# Patient Record
Sex: MALE | Race: WHITE | Employment: FULL TIME | URBAN - NONMETROPOLITAN AREA
[De-identification: names, ages, dates, MRNs, and addresses within clinical notes are randomized per-mention and may not be internally consistent; named-entity substitution may affect disease eponyms.]

---

## 2020-11-18 ENCOUNTER — HOSPITAL ENCOUNTER (OUTPATIENT)
Age: 40
Setting detail: SPECIMEN
Discharge: HOME | End: 2020-11-18
Payer: COMMERCIAL

## 2020-11-18 ENCOUNTER — OFFICE VISIT (OUTPATIENT)
Dept: PRIMARY CARE CLINIC | Age: 40
End: 2020-11-18
Payer: COMMERCIAL

## 2020-11-18 VITALS
DIASTOLIC BLOOD PRESSURE: 80 MMHG | BODY MASS INDEX: 29.31 KG/M2 | RESPIRATION RATE: 16 BRPM | OXYGEN SATURATION: 97 % | HEART RATE: 65 BPM | HEIGHT: 72 IN | SYSTOLIC BLOOD PRESSURE: 124 MMHG | TEMPERATURE: 97.7 F | WEIGHT: 216.4 LBS

## 2020-11-18 PROCEDURE — U0003 INFECTIOUS AGENT DETECTION BY NUCLEIC ACID (DNA OR RNA); SEVERE ACUTE RESPIRATORY SYNDROME CORONAVIRUS 2 (SARS-COV-2) (CORONAVIRUS DISEASE [COVID-19]), AMPLIFIED PROBE TECHNIQUE, MAKING USE OF HIGH THROUGHPUT TECHNOLOGIES AS DESCRIBED BY CMS-2020-01-R: HCPCS

## 2020-11-18 PROCEDURE — 99213 OFFICE O/P EST LOW 20 MIN: CPT | Performed by: NURSE PRACTITIONER

## 2020-11-18 ASSESSMENT — ENCOUNTER SYMPTOMS
ABDOMINAL PAIN: 0
SORE THROAT: 0
RESPIRATORY NEGATIVE: 1
VOMITING: 0
COUGH: 0
NAUSEA: 0

## 2020-11-18 ASSESSMENT — PATIENT HEALTH QUESTIONNAIRE - PHQ9
SUM OF ALL RESPONSES TO PHQ QUESTIONS 1-9: 0
SUM OF ALL RESPONSES TO PHQ QUESTIONS 1-9: 0
1. LITTLE INTEREST OR PLEASURE IN DOING THINGS: 0
2. FEELING DOWN, DEPRESSED OR HOPELESS: 0
SUM OF ALL RESPONSES TO PHQ QUESTIONS 1-9: 0
SUM OF ALL RESPONSES TO PHQ9 QUESTIONS 1 & 2: 0

## 2020-11-18 NOTE — PROGRESS NOTES
[facility]             [address] [place] [address]                        Telephone [phone]             Fax [phone]     Dominick Barker  1980  MIS:[id number]   Date of visit:  11/18/2020    Subjective:    Dominick Barker is a 41 y.o.  male who presents to [facility] today (11/18/2020) for evaluation of:    Chief Complaint   Patient presents with    Other     no symptoms was exposed to co-worker positive for covid       Other   This is a new problem. The current episode started 1 to 4 weeks ago (exposure 10 days ago to coworker who tested positive this week for Covid-19). Pertinent negatives include no abdominal pain, chest pain, chills, congestion, coughing, fatigue, fever, headaches, myalgias, nausea, sore throat or vomiting. Nothing aggravates the symptoms. He has tried nothing for the symptoms. The treatment provided no relief. He has the following problem list:  Patient Active Problem List   Diagnosis    Peritonsillar abscess    Tobacco abuse    Tachycardia    Strep pharyngitis        Current medications are:  No current outpatient medications on file. No current facility-administered medications for this visit. He has No Known Allergies. Tavo Myers He  reports that he has been smoking. He started smoking about 8 years ago. He has been smoking about 0.25 packs per day. He does not have any smokeless tobacco history on file. Objective:    Vitals:    11/18/20 1016   BP: 124/80   Pulse: 65   Resp: 16   Temp: 97.7 °F (36.5 °C)   TempSrc: Temporal   SpO2: 97%   Weight: 216 lb 6.4 oz (98.2 kg)   Height: 6' (1.829 m)     Body mass index is 29.35 kg/m². Review of Systems   Constitutional: Negative. Negative for chills, fatigue and fever. HENT: Negative. Negative for congestion and sore throat. Respiratory: Negative. Negative for cough. Cardiovascular: Negative. Negative for chest pain. Gastrointestinal: Negative for abdominal pain, nausea and vomiting. Musculoskeletal: Negative for myalgias. Neurological: Negative for headaches. Physical Exam  Vitals signs and nursing note reviewed. Constitutional:       Appearance: He is well-developed. HENT:      Head: Normocephalic. Jaw: There is normal jaw occlusion. Right Ear: Tympanic membrane, ear canal and external ear normal.      Left Ear: Tympanic membrane, ear canal and external ear normal.      Nose: Nose normal.      Mouth/Throat:      Lips: Pink. Mouth: Mucous membranes are moist.      Pharynx: Oropharynx is clear. Uvula midline. Eyes:      Pupils: Pupils are equal, round, and reactive to light. Neck:      Musculoskeletal: Normal range of motion and neck supple. Cardiovascular:      Rate and Rhythm: Normal rate and regular rhythm. Heart sounds: Normal heart sounds. Pulmonary:      Effort: Pulmonary effort is normal.      Breath sounds: Normal breath sounds and air entry. Lymphadenopathy:      Cervical: No cervical adenopathy. Skin:     General: Skin is warm and dry. Neurological:      Mental Status: He is alert and oriented to person, place, and time. Psychiatric:         Behavior: Behavior normal.         Thought Content: Thought content normal.       Assessment and Plan:    No results found for this visit on 11/18/20. Diagnosis Orders   1. Exposure to COVID-19 virus       Self quarantine until negative Covid-19 test result received and symptoms improving. We will call with Covid-19 test results. Symptomatic care as discussed if symptoms develop. Follow up with PCP as needed. The use, risks, benefits, and side effects of prescribed or recommended medications were discussed. All questions were answered and the patient/caregiver voiced understanding. No orders of the defined types were placed in this encounter.         Electronically signed by Sesar Whiting APRN - CNP on 11/18/20 at 10:26 AM EST

## 2020-11-18 NOTE — LETTER
[address] [facility]  [address] [facility] [address]  Phone: [phone]  Fax: [phone]    JOHNNIE Bob CNP        November 18, 2020     Patient: Darylene Lopes   YOB: 1980   Date of Visit: 11/18/2020       To Whom it May Concern:    Connor Watkins was seen in my clinic on 11/18/2020. May return to work with negative Covid-19 test result and improved symptoms. Test result in 3-7 days. If you have any questions or concerns, please don't hesitate to call.     Sincerely,         JOHNNIE Bob - CNP

## 2020-11-18 NOTE — PATIENT INSTRUCTIONS
Patient Education        Viral Infections: Care Instructions  Your Care Instructions     You don't feel well, but it's not clear what's causing it. You may have a viral infection. Viruses cause many illnesses, such as the common cold, influenza, fever, rashes, and the diarrhea, nausea, and vomiting that are often called \"stomach flu. \" You may wonder if antibiotic medicines could make you feel better. But antibiotics only treat infections caused by bacteria. They don't work on viruses. The good news is that viral infections usually aren't serious. Most will go away in a few days without medical treatment. In the meantime, there are a few things you can do to make yourself more comfortable. Follow-up care is a key part of your treatment and safety. Be sure to make and go to all appointments, and call your doctor if you are having problems. It's also a good idea to know your test results and keep a list of the medicines you take. How can you care for yourself at home? · Get plenty of rest if you feel tired. · Take an over-the-counter pain medicine if needed, such as acetaminophen (Tylenol), ibuprofen (Advil, Motrin), or naproxen (Aleve). Read and follow all instructions on the label. · Be careful when taking over-the-counter cold or flu medicines and Tylenol at the same time. Many of these medicines have acetaminophen, which is Tylenol. Read the labels to make sure that you are not taking more than the recommended dose. Too much acetaminophen (Tylenol) can be harmful. · Drink plenty of fluids, enough so that your urine is light yellow or clear like water. If you have kidney, heart, or liver disease and have to limit fluids, talk with your doctor before you increase the amount of fluids you drink. · Stay home from work, school, and other public places while you have a fever. When should you call for help? Call 911 anytime you think you may need emergency care.  For example, call if:    · You have severe serious illnesses like [place] respiratory syndrome (MERS) and severe acute respiratory syndrome (SARS). COVID-19 is caused by a novel coronavirus. That means it's a new type that has not been seen in people before. How is COVID-19 treated? Mild illness can be treated at home, but more serious illness needs to be treated in the hospital. Treatment may include medicines to reduce symptoms, plus breathing support such as oxygen therapy or a ventilator. Other treatments, such as antiviral medicines, may help people who have COVID-19. What can you do to protect yourself from COVID-19? The best way to protect yourself from getting sick is to:  · Avoid areas where there is an outbreak. · Avoid contact with people who may be infected. · Avoid crowds and try to stay at least 6 feet away from other people. · Wash your hands often, especially after you cough or sneeze. Use soap and water, and scrub for at least 20 seconds. If soap and water aren't available, use an alcohol-based hand . · Avoid touching your mouth, nose, and eyes. What can you do to avoid spreading the virus to others? To help avoid spreading the virus to others:  · Wash your hands often with soap or alcohol-based hand sanitizers. · Cover your mouth with a tissue when you cough or sneeze. Then throw the tissue in the trash. · Use a disinfectant to clean things that you touch often. These include doorknobs, remote controls, phones, and handles on your refrigerator and microwave. And don't forget countertops, tabletops, bathrooms, and computer keyboards. · Wear a cloth face cover if you have to go to public areas. If you know or suspect that you have COVID-19:  · Stay home. Don't go to school, work, or public areas. And don't use public transportation, ride-shares, or taxis unless you have no choice. · Leave your home only if you need to get medical care or testing. But call the doctor's office first so they know you're coming.  And wear a face cover. · Limit contact with people in your home. If possible, stay in a separate bedroom and use a separate bathroom. · Wear a face cover whenever you're around other people. It can help stop the spread of the virus when you cough or sneeze. · Clean and disinfect your home every day. Use household  and disinfectant wipes or sprays. Take special care to clean things that you grab with your hands. · Self-isolate until it's safe to be around others again. ? If you have symptoms, it's safe when you haven't had a fever for 3 days and your symptoms have improved and it's been at least 10 days since your symptoms started. ? If you were exposed to the virus but don't have symptoms, it's safe to be around others 14 days after exposure. ? Talk to your doctor about whether you also need testing, especially if you have a weakened immune system. When to call for help  Call 911 anytime you think you may need emergency care. For example, call if:  · You have severe trouble breathing. (You can't talk at all.)  · You have constant chest pain or pressure. · You are severely dizzy or lightheaded. · You are confused or can't think clearly. · Your face and lips have a blue color. · You passed out (lost consciousness) or are very hard to wake up. Call your doctor now if you develop symptoms such as:  · Shortness of breath. · Fever. · Cough. If you need to get care, call ahead to the doctor's office for instructions before you go. Make sure you wear a face cover to prevent exposing other people to the virus. Where can you get the latest information? The following health organizations are tracking and studying this virus. Their websites contain the most up-to-date information. Ming Ao also learn what to do if you think you may have been exposed to the virus. · U.[place]. Centers for Disease Control and Prevention (CDC): The CDC provides updated news about the disease and travel advice.  The website also tells you how to prevent the spread of infection. www.cdc.gov  · World Health Organization [facility]): WHO offers information about the virus outbreaks. WHO also has travel advice. www.who.int  Current as of: July 10, 2020               Content Version: 12.6  © 2006-2020 [employer]. Care instructions adapted under license by [facility] ([facility]). If you have questions about a medical condition or this instruction, always ask your healthcare professional. [address] any warranty or liability for your use of this information. Patient Education        Coronavirus ([id number]): Care Instructions  Overview  The coronavirus disease (COVID-19) is caused by a virus. Symptoms may include a fever, a cough, and shortness of breath. It mainly spreads person-to-person through droplets from coughing and sneezing. The virus also can spread when people are in close contact with someone who is infected. Most people have mild symptoms and can take care of themselves at home. If their symptoms get worse, they may need care in a hospital. Treatment may include medicines to reduce symptoms, plus breathing support such as oxygen therapy or a ventilator. It's important to not spread the virus to others. If you have COVID-19, wear a face cover anytime you are around other people. You need to isolate yourself while you are sick. Leave your home only if you need to get medical care or testing. Follow-up care is a key part of your treatment and safety. Be sure to make and go to all appointments, and call your doctor if you are having problems. It's also a good idea to know your test results and keep a list of the medicines you take. How can you care for yourself at home? · Get extra rest. It can help you feel better. · Drink plenty of fluids. This helps replace fluids lost from fever. Fluids also help ease a scratchy throat. Water, soup, fruit juice, and hot tea with lemon are good choices.   · Take acetaminophen (such as Tylenol) to reduce a fever. It may also help with muscle aches. Read and follow all instructions on the label. · Use petroleum jelly on sore skin. This can help if the skin around your nose and lips becomes sore from rubbing a lot with tissues. Tips for self-isolation  · Limit contact with people in your home. If possible, stay in a separate bedroom and use a separate bathroom. · Wear a cloth face cover when you are around other people. It can help stop the spread of the virus when you cough or sneeze. · If you have to leave home, avoid crowds and try to stay at least 6 feet away from other people. · Avoid contact with pets and other animals. · Cover your mouth and nose with a tissue when you cough or sneeze. Then throw it in the trash right away. · Wash your hands often, especially after you cough or sneeze. Use soap and water, and scrub for at least 20 seconds. If soap and water aren't available, use an alcohol-based hand . · Don't share personal household items. These include bedding, towels, cups and glasses, and eating utensils. · [address] in the warmest water allowed for the fabric type, and dry it completely. It's okay to wash other people's laundry with yours. · Clean and disinfect your home every day. Use household  and disinfectant wipes or sprays. Take special care to clean things that you grab with your hands. These include doorknobs, remote controls, phones, and handles on your refrigerator and microwave. And don't forget countertops, tabletops, bathrooms, and computer keyboards. When you can end self-isolation  · If you know or suspect that you have COVID-19, stay in self-isolation until:  ? You haven't had a fever for 3 days, and  ? Your symptoms have improved, and  ? It's been at least 10 days since your symptoms started. · Talk to your doctor about whether you also need testing, especially if you have a weakened immune system.   When should you call for help? Call 911 anytime you think you may need emergency care. For example, call if you have life-threatening symptoms, such as:    · You have severe trouble breathing. (You can't talk at all.)     · You have constant chest pain or pressure.     · You are severely dizzy or lightheaded.     · You are confused or can't think clearly.     · Your face and lips have a blue color.     · You pass out (lose consciousness) or are very hard to wake up. Call your doctor now or seek immediate medical care if:    · You have moderate trouble breathing. (You can't speak a full sentence.)     · You are coughing up blood (more than about 1 teaspoon).     · You have signs of low blood pressure. These include feeling lightheaded; being too weak to stand; and having cold, pale, clammy skin. Watch closely for changes in your health, and be sure to contact your doctor if:    · Your symptoms get worse.     · You are not getting better as expected. Call before you go to the doctor's office. Follow their instructions. And wear a cloth face cover. Current as of: July 10, 2020               Content Version: 12.6  © 2006-2020 [employer]. Care instructions adapted under license by [facility] ([facility]). If you have questions about a medical condition or this instruction, always ask your healthcare professional. [address] any warranty or liability for your use of this information.

## 2020-11-20 LAB — SARS-COV-2, NAA: NOT DETECTED

## 2021-06-10 ENCOUNTER — OFFICE VISIT (OUTPATIENT)
Dept: FAMILY MEDICINE CLINIC | Age: 41
End: 2021-06-10
Payer: COMMERCIAL

## 2021-06-10 VITALS
TEMPERATURE: 98.8 F | HEIGHT: 72 IN | HEART RATE: 68 BPM | OXYGEN SATURATION: 98 % | RESPIRATION RATE: 20 BRPM | BODY MASS INDEX: 29.12 KG/M2 | WEIGHT: 215 LBS | SYSTOLIC BLOOD PRESSURE: 121 MMHG | DIASTOLIC BLOOD PRESSURE: 80 MMHG

## 2021-06-10 DIAGNOSIS — J02.0 STREP THROAT: Primary | ICD-10-CM

## 2021-06-10 DIAGNOSIS — J02.9 SORE THROAT: ICD-10-CM

## 2021-06-10 LAB — S PYO AG THROAT QL: POSITIVE

## 2021-06-10 PROCEDURE — 99213 OFFICE O/P EST LOW 20 MIN: CPT | Performed by: NURSE PRACTITIONER

## 2021-06-10 PROCEDURE — 87880 STREP A ASSAY W/OPTIC: CPT | Performed by: NURSE PRACTITIONER

## 2021-06-10 RX ORDER — PENICILLIN V POTASSIUM 500 MG/1
500 TABLET ORAL 2 TIMES DAILY
Qty: 20 TABLET | Refills: 0 | Status: SHIPPED | OUTPATIENT
Start: 2021-06-10 | End: 2021-06-20

## 2021-06-10 SDOH — ECONOMIC STABILITY: FOOD INSECURITY: WITHIN THE PAST 12 MONTHS, THE FOOD YOU BOUGHT JUST DIDN'T LAST AND YOU DIDN'T HAVE MONEY TO GET MORE.: NEVER TRUE

## 2021-06-10 SDOH — ECONOMIC STABILITY: FOOD INSECURITY: WITHIN THE PAST 12 MONTHS, YOU WORRIED THAT YOUR FOOD WOULD RUN OUT BEFORE YOU GOT MONEY TO BUY MORE.: NEVER TRUE

## 2021-06-10 ASSESSMENT — ENCOUNTER SYMPTOMS
SORE THROAT: 1
VOMITING: 0
COUGH: 0
NAUSEA: 1

## 2021-06-10 ASSESSMENT — PATIENT HEALTH QUESTIONNAIRE - PHQ9
SUM OF ALL RESPONSES TO PHQ QUESTIONS 1-9: 0
SUM OF ALL RESPONSES TO PHQ QUESTIONS 1-9: 0
SUM OF ALL RESPONSES TO PHQ9 QUESTIONS 1 & 2: 0
2. FEELING DOWN, DEPRESSED OR HOPELESS: 0
SUM OF ALL RESPONSES TO PHQ QUESTIONS 1-9: 0
1. LITTLE INTEREST OR PLEASURE IN DOING THINGS: 0

## 2021-06-10 ASSESSMENT — SOCIAL DETERMINANTS OF HEALTH (SDOH): HOW HARD IS IT FOR YOU TO PAY FOR THE VERY BASICS LIKE FOOD, HOUSING, MEDICAL CARE, AND HEATING?: NOT HARD AT ALL

## 2021-06-10 NOTE — PROGRESS NOTES
[facility] [place] [address] In [address] [facility], APRN-[facility]  [address]  Phone:  [phone]  Fax:  [phone]  Anthony Sheriff is a 41 y.o. male who presents today for his medical conditions/complaints as noted below. Anthony Sheriff c/o of Pharyngitis (trouble swollowing, lumps on left side of face )      HPI:     Pharyngitis  This is a new problem. The current episode started in the past 7 days (2 days). The problem has been gradually worsening. Associated symptoms include nausea, neck pain and a sore throat. Pertinent negatives include no coughing, fever, headaches, rash or vomiting. He has tried nothing for the symptoms. Wt Readings from Last 3 Encounters:   06/10/21 215 lb (97.5 kg)   11/18/20 216 lb 6.4 oz (98.2 kg)   01/04/16 237 lb 11.2 oz (107.8 kg)       Temp Readings from Last 3 Encounters:   06/10/21 98.8 °F (37.1 °C)   11/18/20 97.7 °F (36.5 °C) (Temporal)   01/04/16 97.9 °F (36.6 °C) (Oral)       BP Readings from Last 3 Encounters:   06/10/21 121/80   11/18/20 124/80   01/04/16 126/74       Pulse Readings from Last 3 Encounters:   06/10/21 68   11/18/20 65   01/04/16 72              History reviewed. No pertinent past medical history.    Past Surgical History:   Procedure Laterality Date    EKG 12-LEAD  1/4/2016         SINUS SURGERY       Family History   Problem Relation Age of Onset    Other Mother     Heart Disease Father     High Blood Pressure Father     High Cholesterol Father     Cancer Father      Social History     Tobacco Use    Smoking status: Light Tobacco Smoker     Packs/day: 0.25     Start date: 11/18/2012    Smokeless tobacco: Never Used   Substance Use Topics    Alcohol use: Yes     Comment: occasional      Current Outpatient Medications   Medication Sig Dispense Refill    penicillin v potassium (VEETID) 500 MG tablet Take 1 tablet by mouth 2 times daily for 10 days 20 tablet 0     No current facility-administered medications for this visit. No Known Allergies    No exam data present    Subjective:      Review of Systems   Constitutional: Negative for fever. HENT: Positive for sore throat. Respiratory: Negative for cough. Gastrointestinal: Positive for nausea. Negative for vomiting. Musculoskeletal: Positive for neck pain. Skin: Negative for rash. Neurological: Negative for headaches. Objective:     /80 (Site: Left Upper Arm, Position: Sitting, Cuff Size: Large Adult)   Pulse 68   Temp 98.8 °F (37.1 °C)   Resp 20   Ht 6' (1.829 m)   Wt 215 lb (97.5 kg)   SpO2 98%   BMI 29.16 kg/m²     Physical Exam  Vitals reviewed. Constitutional:       General: He is not in acute distress. Appearance: He is well-developed. He is not ill-appearing, toxic-appearing or diaphoretic. HENT:      Head: Normocephalic. Right Ear: Tympanic membrane, ear canal and external ear normal.      Left Ear: Tympanic membrane, ear canal and external ear normal.      Nose: Nose normal. No mucosal edema, congestion or rhinorrhea. Mouth/Throat:      Mouth: Mucous membranes are moist. Mucous membranes are not pale and not dry. Pharynx: Oropharynx is clear. Posterior oropharyngeal erythema present. Eyes:      General: Lids are normal. No scleral icterus. Right eye: No discharge. Left eye: No discharge. Extraocular Movements:      Right eye: No nystagmus. Left eye: No nystagmus. Conjunctiva/sclera: Conjunctivae normal.   Neck:      Trachea: Trachea normal.   Cardiovascular:      Rate and Rhythm: Normal rate and regular rhythm. Heart sounds: Normal heart sounds. Pulmonary:      Effort: Pulmonary effort is normal. No accessory muscle usage or respiratory distress. Breath sounds: Normal breath sounds. Musculoskeletal:         General: Normal range of motion. Cervical back: Full passive range of motion without pain and normal range of motion.    Lymphadenopathy:      Head: Right side of head: Tonsillar adenopathy present. Left side of head: Tonsillar adenopathy present. Skin:     General: Skin is warm and dry. Capillary Refill: Capillary refill takes less than 2 seconds. Coloration: Skin is not pale. Neurological:      Mental Status: He is alert and oriented to person, place, and time. Psychiatric:         Mood and Affect: Mood normal.         Speech: Speech normal.         Behavior: Behavior normal.         Thought Content: Thought content normal.         Judgment: Judgment normal.         Assessment:      Diagnosis Orders   1. Strep throat  penicillin v potassium (VEETID) 500 MG tablet   2. Sore throat  POCT rapid strep A     Results for POC orders placed in visit on 06/10/21   POCT rapid strep A   Result Value Ref Range    Strep A Ag Positive (A) None Detected               Plan:         Penicillin as directed. Work note for tomorrow. Follow up with primary care provider in 1 to 2 days if needed. Patient Instructions     Penicillin as directed. Work note for tomorrow. Follow up with primary care provider in 1 to 2 days if needed. Patient Education        Strep Throat: Care Instructions  Your Care Instructions     Strep throat is a bacterial infection that causes sudden, severe sore throat and fever. Strep throat, which is caused by bacteria called streptococcus, is treated with antibiotics. Sometimes a strep test is necessary to tell if the sore throat is caused by strep bacteria. Treatment can help ease symptoms and may prevent future problems. Follow-up care is a key part of your treatment and safety. Be sure to make and go to all appointments, and call your doctor if you are having problems. It's also a good idea to know your test results and keep a list of the medicines you take. How can you care for yourself at home? · Take your antibiotics as directed. Do not stop taking them just because you feel better.  You need to take the full course of antibiotics. · Strep throat can spread to others until 24 hours after you begin taking antibiotics. During this time, avoid contact with other people at work, school, or home, especially infants and children. Do not sneeze or cough on others, and wash your hands often. Keep your drinking glass and eating utensils separate from those of others. Wash these items well in hot, soapy water. · Gargle with warm salt water at least once each hour to help reduce swelling and make your throat feel better. Use 1 teaspoon of salt mixed in 8 fluid ounces of warm water. · Take an over-the-counter pain medication, such as acetaminophen (Tylenol), ibuprofen (Advil, Motrin), or naproxen (Aleve). Read and follow all instructions on the label. · Try an over-the-counter anesthetic throat spray or throat lozenges, which may help relieve throat pain. · Drink plenty of fluids. Fluids may help soothe an irritated throat. Hot fluids, such as tea or soup, may help your throat feel better. · Eat soft solids and drink plenty of clear liquids. Flavored ice pops, ice cream, scrambled eggs, sherbet, and gelatin dessert (such as Jell-O) may also soothe the throat. · Get lots of rest.  · Do not smoke, and avoid secondhand smoke. If you need help quitting, talk to your doctor about stop-smoking programs and medicines. These can increase your chances of quitting for good. · Use a vaporizer or humidifier to add moisture to the air in your bedroom. Follow the directions for cleaning the machine. When should you call for help? Call your doctor now or seek immediate medical care if:    · You have a new or higher fever.     · You have a fever with a stiff neck or severe headache.     · You have new or worse trouble swallowing.     · Your sore throat gets much worse on one side.     · Your pain becomes much worse on one side of your throat.    Watch closely for changes in your health, and be sure to contact your doctor if:    · You are not getting better after 2 days (48 hours).     · You do not get better as expected. Where can you learn more? Go to https://S4 Worldwidepepiceweb.EQAL. org and sign in to your HipSnip account. Enter K625 in the [facility] box to learn more about \"Strep Throat: Care Instructions. \"     If you do not have an account, please click on the \"Sign Up Now\" link. Current as of: December 2, 2020               Content Version: 12.8  © 5456-7358 Healthwise, [employer]. Care instructions adapted under license by [facility] ([facility]). If you have questions about a medical condition or this instruction, always ask your healthcare professional. [place] [address] any warranty or liability for your use of this information. Patient/Caregiver instructed on use, benefit, and side effects of prescribed medications. All patient/parent/caregiver questions answered. Patient/parent/caregiver voiced understanding. Reviewed health maintenance. Instructed to continue current medications, diet and exercise. Patient agreed with treatment plan. Follow up as directed.            Electronically signed by JOHNNIE Manjarrez NP on6/10/2021

## 2021-06-10 NOTE — PATIENT INSTRUCTIONS
Penicillin as directed. Work note for tomorrow. Follow up with primary care provider in 1 to 2 days if needed. Patient Education        Strep Throat: Care Instructions  Your Care Instructions     Strep throat is a bacterial infection that causes sudden, severe sore throat and fever. Strep throat, which is caused by bacteria called streptococcus, is treated with antibiotics. Sometimes a strep test is necessary to tell if the sore throat is caused by strep bacteria. Treatment can help ease symptoms and may prevent future problems. Follow-up care is a key part of your treatment and safety. Be sure to make and go to all appointments, and call your doctor if you are having problems. It's also a good idea to know your test results and keep a list of the medicines you take. How can you care for yourself at home? · Take your antibiotics as directed. Do not stop taking them just because you feel better. You need to take the full course of antibiotics. · Strep throat can spread to others until 24 hours after you begin taking antibiotics. During this time, avoid contact with other people at work, school, or home, especially infants and children. Do not sneeze or cough on others, and wash your hands often. Keep your drinking glass and eating utensils separate from those of others. Wash these items well in hot, soapy water. · Gargle with warm salt water at least once each hour to help reduce swelling and make your throat feel better. Use 1 teaspoon of salt mixed in 8 fluid ounces of warm water. · Take an over-the-counter pain medication, such as acetaminophen (Tylenol), ibuprofen (Advil, Motrin), or naproxen (Aleve). Read and follow all instructions on the label. · Try an over-the-counter anesthetic throat spray or throat lozenges, which may help relieve throat pain. · Drink plenty of fluids. Fluids may help soothe an irritated throat. Hot fluids, such as tea or soup, may help your throat feel better.   · Eat soft solids and drink plenty of clear liquids. Flavored ice pops, ice cream, scrambled eggs, sherbet, and gelatin dessert (such as Jell-O) may also soothe the throat. · Get lots of rest.  · Do not smoke, and avoid secondhand smoke. If you need help quitting, talk to your doctor about stop-smoking programs and medicines. These can increase your chances of quitting for good. · Use a vaporizer or humidifier to add moisture to the air in your bedroom. Follow the directions for cleaning the machine. When should you call for help? Call your doctor now or seek immediate medical care if:    · You have a new or higher fever.     · You have a fever with a stiff neck or severe headache.     · You have new or worse trouble swallowing.     · Your sore throat gets much worse on one side.     · Your pain becomes much worse on one side of your throat. Watch closely for changes in your health, and be sure to contact your doctor if:    · You are not getting better after 2 days (48 hours).     · You do not get better as expected. Where can you learn more? Go to https://[employer].ENOVIX. org and sign in to your Ogin account. Enter K625 in the [facility] box to learn more about \"Strep Throat: Care Instructions. \"     If you do not have an account, please click on the \"Sign Up Now\" link. Current as of: December 2, 2020               Content Version: 12.8  © 1792-2826 Healthwise, [facility]. Care instructions adapted under license by [facility] ([facility]). If you have questions about a medical condition or this instruction, always ask your healthcare professional. [place] [address] any warranty or liability for your use of this information.

## 2021-09-07 ENCOUNTER — OFFICE VISIT (OUTPATIENT)
Dept: FAMILY MEDICINE CLINIC | Age: 41
End: 2021-09-07
Payer: COMMERCIAL

## 2021-09-07 VITALS
SYSTOLIC BLOOD PRESSURE: 138 MMHG | BODY MASS INDEX: 28.44 KG/M2 | OXYGEN SATURATION: 96 % | HEIGHT: 72 IN | TEMPERATURE: 99.5 F | HEART RATE: 68 BPM | WEIGHT: 210 LBS | DIASTOLIC BLOOD PRESSURE: 90 MMHG

## 2021-09-07 DIAGNOSIS — J02.9 SORE THROAT: ICD-10-CM

## 2021-09-07 DIAGNOSIS — J02.8 ACUTE BACTERIAL PHARYNGITIS: Primary | ICD-10-CM

## 2021-09-07 DIAGNOSIS — B96.89 ACUTE BACTERIAL PHARYNGITIS: Primary | ICD-10-CM

## 2021-09-07 LAB — S PYO AG THROAT QL: NORMAL

## 2021-09-07 PROCEDURE — 99213 OFFICE O/P EST LOW 20 MIN: CPT | Performed by: NURSE PRACTITIONER

## 2021-09-07 PROCEDURE — 87880 STREP A ASSAY W/OPTIC: CPT | Performed by: NURSE PRACTITIONER

## 2021-09-07 RX ORDER — IBUPROFEN 800 MG/1
800 TABLET ORAL EVERY 8 HOURS PRN
Qty: 90 TABLET | Refills: 0 | Status: SHIPPED | OUTPATIENT
Start: 2021-09-07

## 2021-09-07 RX ORDER — AMOXICILLIN AND CLAVULANATE POTASSIUM 875; 125 MG/1; MG/1
1 TABLET, FILM COATED ORAL 2 TIMES DAILY
Qty: 20 TABLET | Refills: 0 | Status: SHIPPED | OUTPATIENT
Start: 2021-09-07 | End: 2021-09-17

## 2021-09-07 RX ORDER — PREDNISONE 20 MG/1
20 TABLET ORAL 2 TIMES DAILY
Qty: 10 TABLET | Refills: 0 | Status: SHIPPED | OUTPATIENT
Start: 2021-09-07 | End: 2021-09-12

## 2021-09-07 ASSESSMENT — ENCOUNTER SYMPTOMS: SORE THROAT: 1

## 2021-09-07 NOTE — PATIENT INSTRUCTIONS
Augmentin as directed. Ibuprofen 800 mg every 8 hours for tonight. In morning start prednisone. Stop ibuprofen for 5 days. Follow up with primary care provider in 1 to 2 days if needed. Patient Education        Sore Throat: Care Instructions  Your Care Instructions     Infection by bacteria or a virus causes most sore throats. Cigarette smoke, dry air, air pollution, allergies, and yelling can also cause a sore throat. Sore throats can be painful and annoying. Fortunately, most sore throats go away on their own. If you have a bacterial infection, your doctor may prescribe antibiotics. Follow-up care is a key part of your treatment and safety. Be sure to make and go to all appointments, and call your doctor if you are having problems. It's also a good idea to know your test results and keep a list of the medicines you take. How can you care for yourself at home? · If your doctor prescribed antibiotics, take them as directed. Do not stop taking them just because you feel better. You need to take the full course of antibiotics. · Gargle with warm salt water once an hour to help reduce swelling and relieve discomfort. Use 1 teaspoon of salt mixed in 1 cup of warm water. · Take an over-the-counter pain medicine, such as acetaminophen (Tylenol), ibuprofen (Advil, Motrin), or naproxen (Aleve). Read and follow all instructions on the label. · Be careful when taking over-the-counter cold or flu medicines and Tylenol at the same time. Many of these medicines have acetaminophen, which is Tylenol. Read the labels to make sure that you are not taking more than the recommended dose. Too much acetaminophen (Tylenol) can be harmful. · Drink plenty of fluids. Fluids may help soothe an irritated throat. Hot fluids, such as tea or soup, may help decrease throat pain. · Use over-the-counter throat lozenges to soothe pain. Regular cough drops or hard candy may also help.  These should not be given to young children because of the risk of choking. · Do not smoke or allow others to smoke around you. If you need help quitting, talk to your doctor about stop-smoking programs and medicines. These can increase your chances of quitting for good. · Use a vaporizer or humidifier to add moisture to your bedroom. Follow the directions for cleaning the machine. When should you call for help? Call your doctor now or seek immediate medical care if:    · You have new or worse trouble swallowing.     · Your sore throat gets much worse on one side. Watch closely for changes in your health, and be sure to contact your doctor if you do not get better as expected. Where can you learn more? Go to https://LOGIC DEVICESpepiceweb.[employer]. org and sign in to your ANPI account. Enter G880 in the [facility] box to learn more about \"Sore Throat: Care Instructions. \"     If you do not have an account, please click on the \"Sign Up Now\" link. Current as of: December 2, 2020               Content Version: 12.9  © 2006-2021 Healthwise, Incorporated. Care instructions adapted under license by [facility] ([facility]). If you have questions about a medical condition or this instruction, always ask your healthcare professional. [place] [address] any warranty or liability for your use of this information.

## 2021-09-07 NOTE — PROGRESS NOTES
Take with food. 20 tablet 0    ibuprofen (ADVIL;MOTRIN) 800 MG tablet Take 1 tablet by mouth every 8 hours as needed for Pain (with food) 90 tablet 0    predniSONE (DELTASONE) 20 MG tablet Take 1 tablet by mouth 2 times daily for 5 days 10 tablet 0     No current facility-administered medications for this visit. No Known Allergies    No exam data present    Subjective:      Review of Systems   HENT: Positive for sore throat. Neurological: Positive for headaches. Objective:     BP (!) 138/90 (Site: Left Upper Arm, Position: Sitting, Cuff Size: Large Adult)   Pulse 68   Temp 99.5 °F (37.5 °C)   Ht 6' (1.829 m)   Wt 210 lb (95.3 kg)   SpO2 96%   BMI 28.48 kg/m²     Physical Exam  Constitutional:       General: He is not in acute distress. Appearance: He is well-developed. He is not ill-appearing, toxic-appearing or diaphoretic. HENT:      Head: Normocephalic. Right Ear: Tympanic membrane, ear canal and external ear normal.      Left Ear: Tympanic membrane, ear canal and external ear normal.      Nose: Nose normal. No mucosal edema, congestion or rhinorrhea. Mouth/Throat:      Mouth: Mucous membranes are moist. Mucous membranes are not pale and not dry. Pharynx: Oropharynx is clear. Posterior oropharyngeal erythema and uvula swelling present. Tonsils: 1+ on the right. 4+ on the left. Eyes:      General: Lids are normal. No scleral icterus. Right eye: No discharge. Left eye: No discharge. Extraocular Movements:      Right eye: No nystagmus. Left eye: No nystagmus. Conjunctiva/sclera: Conjunctivae normal.   Neck:      Trachea: Trachea normal.   Cardiovascular:      Rate and Rhythm: Normal rate and regular rhythm. Heart sounds: Normal heart sounds. Pulmonary:      Effort: Pulmonary effort is normal. No accessory muscle usage or respiratory distress. Breath sounds: Normal breath sounds.    Musculoskeletal:         General: Normal range of motion. Cervical back: Full passive range of motion without pain and normal range of motion. Skin:     General: Skin is warm and dry. Capillary Refill: Capillary refill takes less than 2 seconds. Coloration: Skin is not pale. Neurological:      Mental Status: He is alert and oriented to person, place, and time. Psychiatric:         Mood and Affect: Mood normal.         Speech: Speech normal.         Behavior: Behavior normal.         Thought Content: Thought content normal.         Judgment: Judgment normal.         Assessment:      Diagnosis Orders   1. Acute bacterial pharyngitis  amoxicillin-clavulanate (AUGMENTIN) 875-125 MG per tablet    ibuprofen (ADVIL;MOTRIN) 800 MG tablet    predniSONE (DELTASONE) 20 MG tablet   2. Sore throat  POCT rapid strep A     No results found for this visit on 09/07/21. Strep negative. Plan:         Augmentin as directed. Ibuprofen 800 mg every 8 hours for tonight. In morning start prednisone. Stop ibuprofen for 5 days. Follow up with primary care provider in 1 to 2 days if needed. Work note for tomorrow. Patient Instructions     Augmentin as directed. Ibuprofen 800 mg every 8 hours for tonight. In morning start prednisone. Stop ibuprofen for 5 days. Follow up with primary care provider in 1 to 2 days if needed. Patient Education        Sore Throat: Care Instructions  Your Care Instructions     Infection by bacteria or a virus causes most sore throats. Cigarette smoke, dry air, air pollution, allergies, and yelling can also cause a sore throat. Sore throats can be painful and annoying. Fortunately, most sore throats go away on their own. If you have a bacterial infection, your doctor may prescribe antibiotics. Follow-up care is a key part of your treatment and safety. Be sure to make and go to all appointments, and call your doctor if you are having problems.  It's also a good idea to know your test results and keep a list of the medicines you take. How can you care for yourself at home? · If your doctor prescribed antibiotics, take them as directed. Do not stop taking them just because you feel better. You need to take the full course of antibiotics. · Gargle with warm salt water once an hour to help reduce swelling and relieve discomfort. Use 1 teaspoon of salt mixed in 1 cup of warm water. · Take an over-the-counter pain medicine, such as acetaminophen (Tylenol), ibuprofen (Advil, Motrin), or naproxen (Aleve). Read and follow all instructions on the label. · Be careful when taking over-the-counter cold or flu medicines and Tylenol at the same time. Many of these medicines have acetaminophen, which is Tylenol. Read the labels to make sure that you are not taking more than the recommended dose. Too much acetaminophen (Tylenol) can be harmful. · Drink plenty of fluids. Fluids may help soothe an irritated throat. Hot fluids, such as tea or soup, may help decrease throat pain. · Use over-the-counter throat lozenges to soothe pain. Regular cough drops or hard candy may also help. These should not be given to young children because of the risk of choking. · Do not smoke or allow others to smoke around you. If you need help quitting, talk to your doctor about stop-smoking programs and medicines. These can increase your chances of quitting for good. · Use a vaporizer or humidifier to add moisture to your bedroom. Follow the directions for cleaning the machine. When should you call for help? Call your doctor now or seek immediate medical care if:    · You have new or worse trouble swallowing.     · Your sore throat gets much worse on one side. Watch closely for changes in your health, and be sure to contact your doctor if you do not get better as expected. Where can you learn more? Go to https://chfaheem.Moodyo. org and sign in to your Stretchr account.  Enter O063 in the [facility] box to learn more about \"Sore Throat: Care Instructions. \"     If you do not have an account, please click on the \"Sign Up Now\" link. Current as of: December 2, 2020               Content Version: 12.9  © 2006-2021 Healthwise, Incorporated. Care instructions adapted under license by [facility] ([facility]). If you have questions about a medical condition or this instruction, always ask your healthcare professional. [place] [address] any warranty or liability for your use of this information. Patient/Caregiver instructed on use, benefit, and side effects of prescribed medications. All patient/parent/caregiver questions answered. Patient/parent/caregiver voiced understanding. Reviewed health maintenance. Instructed to continue current medications, diet and exercise. Patient agreed with treatment plan. Follow up as directed.            Electronically signed by JOHNNIE Marquez NP on9/7/2021

## 2021-11-29 ENCOUNTER — OFFICE VISIT (OUTPATIENT)
Dept: FAMILY MEDICINE CLINIC | Age: 41
End: 2021-11-29
Payer: COMMERCIAL

## 2021-11-29 ENCOUNTER — HOSPITAL ENCOUNTER (OUTPATIENT)
Age: 41
Setting detail: SPECIMEN
Discharge: HOME | End: 2021-11-29
Payer: COMMERCIAL

## 2021-11-29 VITALS
DIASTOLIC BLOOD PRESSURE: 80 MMHG | WEIGHT: 205 LBS | RESPIRATION RATE: 14 BRPM | BODY MASS INDEX: 27.77 KG/M2 | HEIGHT: 72 IN | HEART RATE: 106 BPM | SYSTOLIC BLOOD PRESSURE: 120 MMHG | OXYGEN SATURATION: 99 % | TEMPERATURE: 103.5 F

## 2021-11-29 DIAGNOSIS — J06.9 VIRAL URI: Primary | ICD-10-CM

## 2021-11-29 DIAGNOSIS — N39.0 URINARY TRACT INFECTION WITHOUT HEMATURIA, SITE UNSPECIFIED: ICD-10-CM

## 2021-11-29 DIAGNOSIS — J06.9 VIRAL URI: ICD-10-CM

## 2021-11-29 DIAGNOSIS — M54.50 ACUTE BILATERAL LOW BACK PAIN WITHOUT SCIATICA: ICD-10-CM

## 2021-11-29 DIAGNOSIS — R50.9 FEVER, UNSPECIFIED FEVER CAUSE: ICD-10-CM

## 2021-11-29 LAB
BILIRUBIN, POC: NEGATIVE
BLOOD URINE, POC: NORMAL
CLARITY, POC: CLEAR
COLOR, POC: YELLOW
GLUCOSE URINE, POC: NEGATIVE
KETONES, POC: NORMAL
LEUKOCYTE EST, POC: NEGATIVE
NITRITE, POC: NEGATIVE
PH, POC: 5.5
PROTEIN, POC: NORMAL
SPECIFIC GRAVITY, POC: >1.03
UROBILINOGEN, POC: 0.2

## 2021-11-29 PROCEDURE — 99203 OFFICE O/P NEW LOW 30 MIN: CPT | Performed by: NURSE PRACTITIONER

## 2021-11-29 PROCEDURE — 81002 URINALYSIS NONAUTO W/O SCOPE: CPT | Performed by: NURSE PRACTITIONER

## 2021-11-29 PROCEDURE — U0005 INFEC AGEN DETEC AMPLI PROBE: HCPCS

## 2021-11-29 PROCEDURE — U0003 INFECTIOUS AGENT DETECTION BY NUCLEIC ACID (DNA OR RNA); SEVERE ACUTE RESPIRATORY SYNDROME CORONAVIRUS 2 (SARS-COV-2) (CORONAVIRUS DISEASE [COVID-19]), AMPLIFIED PROBE TECHNIQUE, MAKING USE OF HIGH THROUGHPUT TECHNOLOGIES AS DESCRIBED BY CMS-2020-01-R: HCPCS

## 2021-11-29 ASSESSMENT — ENCOUNTER SYMPTOMS
NAUSEA: 1
RHINORRHEA: 1
ABDOMINAL PAIN: 1
VOMITING: 0
WHEEZING: 0
SINUS PRESSURE: 1
DIARRHEA: 0
SORE THROAT: 0
CONSTIPATION: 0
SHORTNESS OF BREATH: 0
BACK PAIN: 1
EYES NEGATIVE: 1
COUGH: 0
CHEST TIGHTNESS: 0
ALLERGIC/IMMUNOLOGIC NEGATIVE: 1

## 2021-11-29 NOTE — PROGRESS NOTES
[address]  [address] [place] [address]  Dept: [phone]  Dept Fax: [phone]  Loc: [phone]    Dane Haynes is a 35 y.o. male who presents to the [facility] [place] today for his medical conditions/complaints as noted below. Dane Haynes is c/o of Nasal Congestion (nasal congestion, lower back pain, s/s started wednesday. exposure to covid)          HPI:     Wednesday started with sinus congestion, tired, congestion. Back pain started Saturday or Sunday progressively worse. Working a lot of hours and not eating well. Not sleeping well and using energy drinks and caffeine. No past medical history on file. No Known Allergies    Wt Readings from Last 3 Encounters:   11/29/21 205 lb (93 kg)   09/07/21 210 lb (95.3 kg)   06/10/21 215 lb (97.5 kg)     BP Readings from Last 3 Encounters:   11/29/21 120/80   09/07/21 (!) 138/90   06/10/21 121/80      Temp Readings from Last 3 Encounters:   11/29/21 103.5 °F (39.7 °C)   09/07/21 99.5 °F (37.5 °C)   06/10/21 98.8 °F (37.1 °C)     Pulse Readings from Last 3 Encounters:   11/29/21 106   09/07/21 68   06/10/21 68     SpO2 Readings from Last 3 Encounters:   11/29/21 99%   09/07/21 96%   06/10/21 98%       Subjective:      Review of Systems   Constitutional: Positive for activity change, appetite change, chills, diaphoresis, fatigue and fever. HENT: Positive for congestion, rhinorrhea and sinus pressure. Negative for ear pain, postnasal drip and sore throat. Eyes: Negative. Respiratory: Negative for cough, chest tightness, shortness of breath and wheezing. Cardiovascular: Negative for chest pain and palpitations. Gastrointestinal: Positive for abdominal pain and nausea. Negative for constipation, diarrhea and vomiting. Endocrine: Negative. Genitourinary: Positive for flank pain.  Negative for difficulty urinating and dysuria. Musculoskeletal: Positive for back pain and myalgias. Entire body pain     Allergic/Immunologic: Negative. Neurological: Negative. Negative for dizziness, syncope and light-headedness. Hematological: Negative. Psychiatric/Behavioral: Negative. Objective:     Vitals:    11/29/21 1538   BP: 120/80   Site: Right Upper Arm   Position: Sitting   Cuff Size: Medium Adult   Pulse: 106   Resp: 14   Temp: 103.5 °F (39.7 °C)   SpO2: 99%   Weight: 205 lb (93 kg)   Height: 6' (1.829 m)     Body mass index is 27.8 kg/m². /80 (Site: Right Upper Arm, Position: Sitting, Cuff Size: Medium Adult)   Pulse 106   Temp 103.5 °F (39.7 °C)   Resp 14   Ht 6' (1.829 m)   Wt 205 lb (93 kg)   SpO2 99%   BMI 27.80 kg/m²   Physical Exam  Vitals and nursing note reviewed. Constitutional:       Appearance: He is obese. He is ill-appearing and toxic-appearing. HENT:      Head: Normocephalic. Right Ear: Tympanic membrane, ear canal and external ear normal.      Left Ear: Tympanic membrane, ear canal and external ear normal.      Nose: Congestion and rhinorrhea (clear) present. Mouth/Throat:      Mouth: Mucous membranes are moist.      Pharynx: Posterior oropharyngeal erythema present. Eyes:      Extraocular Movements: Extraocular movements intact. Conjunctiva/sclera: Conjunctivae normal.      Pupils: Pupils are equal, round, and reactive to light. Cardiovascular:      Rate and Rhythm: Normal rate and regular rhythm. Pulses: Normal pulses. Heart sounds: Normal heart sounds. Pulmonary:      Effort: Pulmonary effort is normal.      Breath sounds: Normal breath sounds. No wheezing or rales. Abdominal:      General: Bowel sounds are normal. There is no distension. Palpations: Abdomen is soft. Tenderness: There is no abdominal tenderness. There is no right CVA tenderness, left CVA tenderness or guarding.    Musculoskeletal:         General: Tenderness and deformity present. No signs of injury. Normal range of motion. Cervical back: Normal range of motion and neck supple. No rigidity or tenderness. Lumbar back: Deformity present. Back:       Comments: Body aches and hurts all over., Large nodulation noted to lower right side of back that was mobile. Non-tender with palpation. Patient was shown area and asked to feel it's firmness. He stated that he was not aware of this nodulation being present prior to today. Lymphadenopathy:      Cervical: No cervical adenopathy. Skin:     General: Skin is warm and dry. Capillary Refill: Capillary refill takes less than 2 seconds. Neurological:      General: No focal deficit present. Mental Status: He is alert and oriented to person, place, and time. Cranial Nerves: No cranial nerve deficit. Sensory: No sensory deficit. Gait: Gait normal.   Psychiatric:         Behavior: Behavior normal.         Judgment: Judgment normal.         Assessment:      Diagnosis Orders   1. Viral URI  COVID-19    POCT Urinalysis no Micro   2. Acute bilateral low back pain without sciatica  COVID-19    POCT Urinalysis no Micro   3. Fever, unspecified fever cause     4. Urinary tract infection without hematuria, site unspecified         Results for POC orders placed in visit on 11/29/21   POCT Urinalysis no Micro   Result Value Ref Range    Color, UA yellow     Clarity, UA clear     Glucose, UA POC negative     Bilirubin, UA negative     Ketones, UA trace     Spec Grav, UA >1.030     Blood, UA POC trace-intact     pH, UA 5.5     Protein, UA POC 3+     Urobilinogen, UA 0.2     Leukocytes, UA negative     Nitrite, UA negative          Plan:   Discussed exam and POCT findings with patient. Based on patient's temperature 103.5, symptoms and preliminary UA results, he was advised to go to the ER for further evaluation and treatment.  He was given explanation that he will require laboratory and possible imaging studies for further evaluation and treatment may include IV fluids or medications. All questions were answered and they verbalized understanding and were agreeable with the plan.        Electronically signed by JOHNNIE Cook CNP on 11/30/2021 at 3:06 PM

## 2021-12-01 LAB
SARS-COV-2: ABNORMAL
SARS-COV-2: DETECTED
SOURCE: ABNORMAL

## 2021-12-04 ENCOUNTER — HOSPITAL ENCOUNTER (EMERGENCY)
Age: 41
Discharge: ANOTHER HOSPITAL | End: 2021-12-04
Attending: EMERGENCY MEDICINE
Payer: COMMERCIAL

## 2021-12-04 ENCOUNTER — HOSPITAL ENCOUNTER (INPATIENT)
Age: 41
LOS: 4 days | Discharge: HOME | DRG: 981 | End: 2021-12-08
Attending: EMERGENCY MEDICINE | Admitting: INTERNAL MEDICINE
Payer: COMMERCIAL

## 2021-12-04 ENCOUNTER — APPOINTMENT (OUTPATIENT)
Dept: CT IMAGING | Age: 41
End: 2021-12-04
Payer: COMMERCIAL

## 2021-12-04 VITALS
SYSTOLIC BLOOD PRESSURE: 124 MMHG | OXYGEN SATURATION: 94 % | BODY MASS INDEX: 27.9 KG/M2 | HEIGHT: 72 IN | WEIGHT: 206 LBS | DIASTOLIC BLOOD PRESSURE: 77 MMHG | TEMPERATURE: 100.6 F | RESPIRATION RATE: 21 BRPM | HEART RATE: 69 BPM

## 2021-12-04 DIAGNOSIS — U07.1 COVID-19: Primary | ICD-10-CM

## 2021-12-04 DIAGNOSIS — R09.02 HYPOXIA: ICD-10-CM

## 2021-12-04 DIAGNOSIS — J12.82 PNEUMONIA DUE TO COVID-19 VIRUS: ICD-10-CM

## 2021-12-04 DIAGNOSIS — J36 PERITONSILLAR ABSCESS: Primary | ICD-10-CM

## 2021-12-04 DIAGNOSIS — U07.1 PNEUMONIA DUE TO COVID-19 VIRUS: ICD-10-CM

## 2021-12-04 DIAGNOSIS — J36 PERITONSILLAR ABSCESS: ICD-10-CM

## 2021-12-04 LAB
ABSOLUTE EOS #: <0.03 K/UL (ref 0–0.44)
ABSOLUTE IMMATURE GRANULOCYTE: <0.03 K/UL (ref 0–0.3)
ABSOLUTE LYMPH #: 0.72 K/UL (ref 1.1–3.7)
ABSOLUTE MONO #: 0.36 K/UL (ref 0.1–1.2)
ALBUMIN SERPL-MCNC: 3.7 G/DL (ref 3.5–5.2)
ALBUMIN/GLOBULIN RATIO: 1.3 (ref 1–2.5)
ALP BLD-CCNC: 70 U/L (ref 40–129)
ALT SERPL-CCNC: 20 U/L (ref 5–41)
ANION GAP SERPL CALCULATED.3IONS-SCNC: 9 MMOL/L (ref 9–17)
AST SERPL-CCNC: 26 U/L
BASOPHILS # BLD: 0 % (ref 0–2)
BASOPHILS ABSOLUTE: <0.03 K/UL (ref 0–0.2)
BILIRUB SERPL-MCNC: 0.4 MG/DL (ref 0.3–1.2)
BILIRUBIN DIRECT: 0.16 MG/DL
BILIRUBIN, INDIRECT: 0.24 MG/DL (ref 0–1)
BNP INTERPRETATION: NORMAL
BUN BLDV-MCNC: 13 MG/DL (ref 6–20)
BUN/CREAT BLD: 14 (ref 9–20)
CALCIUM SERPL-MCNC: 8.7 MG/DL (ref 8.6–10.4)
CHLORIDE BLD-SCNC: 100 MMOL/L (ref 98–107)
CO2: 28 MMOL/L (ref 20–31)
CREAT SERPL-MCNC: 0.93 MG/DL (ref 0.7–1.2)
D-DIMER QUANTITATIVE: 1.24 MG/L FEU (ref 0–0.59)
DIFFERENTIAL TYPE: ABNORMAL
EOSINOPHILS RELATIVE PERCENT: 0 % (ref 1–4)
GFR AFRICAN AMERICAN: >60 ML/MIN
GFR NON-AFRICAN AMERICAN: >60 ML/MIN
GFR SERPL CREATININE-BSD FRML MDRD: ABNORMAL ML/MIN/{1.73_M2}
GFR SERPL CREATININE-BSD FRML MDRD: ABNORMAL ML/MIN/{1.73_M2}
GLOBULIN: 2.8 G/DL (ref 1.5–3.8)
GLUCOSE BLD-MCNC: 125 MG/DL (ref 70–99)
HCT VFR BLD CALC: 45 % (ref 40.7–50.3)
HEMOGLOBIN: 15 G/DL (ref 13–17)
IMMATURE GRANULOCYTES: 0 %
LACTIC ACID, SEPSIS WHOLE BLOOD: NORMAL MMOL/L (ref 0.5–1.9)
LACTIC ACID, SEPSIS WHOLE BLOOD: NORMAL MMOL/L (ref 0.5–1.9)
LACTIC ACID, SEPSIS: 1.2 MMOL/L (ref 0.5–1.9)
LACTIC ACID, SEPSIS: 1.3 MMOL/L (ref 0.5–1.9)
LYMPHOCYTES # BLD: 13 % (ref 24–43)
MCH RBC QN AUTO: 30.7 PG (ref 25.2–33.5)
MCHC RBC AUTO-ENTMCNC: 33.3 G/DL (ref 25.2–33.5)
MCV RBC AUTO: 92.2 FL (ref 82.6–102.9)
MONOCYTES # BLD: 7 % (ref 3–12)
NRBC AUTOMATED: 0 PER 100 WBC
PDW BLD-RTO: 11.9 % (ref 11.8–14.4)
PLATELET # BLD: 158 K/UL (ref 138–453)
PLATELET ESTIMATE: ABNORMAL
PMV BLD AUTO: 9.7 FL (ref 8.1–13.5)
POTASSIUM SERPL-SCNC: 3.9 MMOL/L (ref 3.7–5.3)
PRO-BNP: 86 PG/ML
PROCALCITONIN: 0.06 NG/ML
RBC # BLD: 4.88 M/UL (ref 4.21–5.77)
RBC # BLD: ABNORMAL 10*6/UL
SEG NEUTROPHILS: 80 % (ref 36–65)
SEGMENTED NEUTROPHILS ABSOLUTE COUNT: 4.27 K/UL (ref 1.5–8.1)
SODIUM BLD-SCNC: 137 MMOL/L (ref 135–144)
TOTAL PROTEIN: 6.5 G/DL (ref 6.4–8.3)
TROPONIN INTERP: NORMAL
TROPONIN INTERP: NORMAL
TROPONIN T: NORMAL NG/ML
TROPONIN T: NORMAL NG/ML
TROPONIN, HIGH SENSITIVITY: 6 NG/L (ref 0–22)
TROPONIN, HIGH SENSITIVITY: <6 NG/L (ref 0–22)
WBC # BLD: 5.4 K/UL (ref 3.5–11.3)
WBC # BLD: ABNORMAL 10*3/UL

## 2021-12-04 PROCEDURE — 87040 BLOOD CULTURE FOR BACTERIA: CPT

## 2021-12-04 PROCEDURE — XW0DXM6 INTRODUCTION OF BARICITINIB INTO MOUTH AND PHARYNX, EXTERNAL APPROACH, NEW TECHNOLOGY GROUP 6: ICD-10-PCS | Performed by: INTERNAL MEDICINE

## 2021-12-04 PROCEDURE — 6360000004 HC RX CONTRAST MEDICATION: Performed by: EMERGENCY MEDICINE

## 2021-12-04 PROCEDURE — 6360000002 HC RX W HCPCS: Performed by: NURSE PRACTITIONER

## 2021-12-04 PROCEDURE — 2060000000 HC ICU INTERMEDIATE R&B

## 2021-12-04 PROCEDURE — 6360000002 HC RX W HCPCS: Performed by: INTERNAL MEDICINE

## 2021-12-04 PROCEDURE — 10060 I&D ABSCESS SIMPLE/SINGLE: CPT

## 2021-12-04 PROCEDURE — 80048 BASIC METABOLIC PNL TOTAL CA: CPT

## 2021-12-04 PROCEDURE — 0C9PXZZ DRAINAGE OF TONSILS, EXTERNAL APPROACH: ICD-10-PCS | Performed by: OTOLARYNGOLOGY

## 2021-12-04 PROCEDURE — 87205 SMEAR GRAM STAIN: CPT

## 2021-12-04 PROCEDURE — 80076 HEPATIC FUNCTION PANEL: CPT

## 2021-12-04 PROCEDURE — 2580000003 HC RX 258: Performed by: INTERNAL MEDICINE

## 2021-12-04 PROCEDURE — 84484 ASSAY OF TROPONIN QUANT: CPT

## 2021-12-04 PROCEDURE — 87070 CULTURE OTHR SPECIMN AEROBIC: CPT

## 2021-12-04 PROCEDURE — 87075 CULTR BACTERIA EXCEPT BLOOD: CPT

## 2021-12-04 PROCEDURE — 42700 I&D ABSCESS PERITONSILLAR: CPT | Performed by: OTOLARYNGOLOGY

## 2021-12-04 PROCEDURE — 83880 ASSAY OF NATRIURETIC PEPTIDE: CPT

## 2021-12-04 PROCEDURE — 96365 THER/PROPH/DIAG IV INF INIT: CPT

## 2021-12-04 PROCEDURE — 93005 ELECTROCARDIOGRAM TRACING: CPT | Performed by: EMERGENCY MEDICINE

## 2021-12-04 PROCEDURE — 99285 EMERGENCY DEPT VISIT HI MDM: CPT

## 2021-12-04 PROCEDURE — 6370000000 HC RX 637 (ALT 250 FOR IP): Performed by: EMERGENCY MEDICINE

## 2021-12-04 PROCEDURE — 36415 COLL VENOUS BLD VENIPUNCTURE: CPT

## 2021-12-04 PROCEDURE — 2580000003 HC RX 258: Performed by: EMERGENCY MEDICINE

## 2021-12-04 PROCEDURE — 85025 COMPLETE CBC W/AUTO DIFF WBC: CPT

## 2021-12-04 PROCEDURE — 2709999900 CT SOFT TISSUE NECK W CONTRAST

## 2021-12-04 PROCEDURE — 96375 TX/PRO/DX INJ NEW DRUG ADDON: CPT

## 2021-12-04 PROCEDURE — 99222 1ST HOSP IP/OBS MODERATE 55: CPT | Performed by: INTERNAL MEDICINE

## 2021-12-04 PROCEDURE — 2709999900 CT CHEST PULMONARY EMBOLISM W CONTRAST

## 2021-12-04 PROCEDURE — 83605 ASSAY OF LACTIC ACID: CPT

## 2021-12-04 PROCEDURE — 2500000003 HC RX 250 WO HCPCS: Performed by: STUDENT IN AN ORGANIZED HEALTH CARE EDUCATION/TRAINING PROGRAM

## 2021-12-04 PROCEDURE — 99221 1ST HOSP IP/OBS SF/LOW 40: CPT | Performed by: OTOLARYNGOLOGY

## 2021-12-04 PROCEDURE — 99222 1ST HOSP IP/OBS MODERATE 55: CPT | Performed by: NURSE PRACTITIONER

## 2021-12-04 PROCEDURE — 6360000002 HC RX W HCPCS: Performed by: EMERGENCY MEDICINE

## 2021-12-04 PROCEDURE — 85379 FIBRIN DEGRADATION QUANT: CPT

## 2021-12-04 PROCEDURE — 84145 PROCALCITONIN (PCT): CPT

## 2021-12-04 RX ORDER — ONDANSETRON 2 MG/ML
4 INJECTION INTRAMUSCULAR; INTRAVENOUS EVERY 6 HOURS PRN
Status: DISCONTINUED | OUTPATIENT
Start: 2021-12-04 | End: 2021-12-08 | Stop reason: HOSPADM

## 2021-12-04 RX ORDER — GUAIFENESIN DEXTROMETHORPHAN HYDROBROMIDE ORAL SOLUTION 10; 100 MG/5ML; MG/5ML
5 SOLUTION ORAL EVERY 4 HOURS PRN
Status: DISCONTINUED | OUTPATIENT
Start: 2021-12-04 | End: 2021-12-08 | Stop reason: HOSPADM

## 2021-12-04 RX ORDER — VITAMIN B COMPLEX
6000 TABLET ORAL DAILY
Status: DISCONTINUED | OUTPATIENT
Start: 2021-12-05 | End: 2021-12-08 | Stop reason: HOSPADM

## 2021-12-04 RX ORDER — ACETAMINOPHEN 500 MG
1000 TABLET ORAL ONCE
Status: COMPLETED | OUTPATIENT
Start: 2021-12-04 | End: 2021-12-04

## 2021-12-04 RX ORDER — ACETAMINOPHEN 325 MG/1
650 TABLET ORAL EVERY 6 HOURS PRN
Status: DISCONTINUED | OUTPATIENT
Start: 2021-12-04 | End: 2021-12-08 | Stop reason: HOSPADM

## 2021-12-04 RX ORDER — DEXAMETHASONE SODIUM PHOSPHATE 10 MG/ML
10 INJECTION INTRAMUSCULAR; INTRAVENOUS ONCE
Status: COMPLETED | OUTPATIENT
Start: 2021-12-04 | End: 2021-12-04

## 2021-12-04 RX ORDER — SODIUM CHLORIDE 0.9 % (FLUSH) 0.9 %
5-40 SYRINGE (ML) INJECTION EVERY 12 HOURS SCHEDULED
Status: DISCONTINUED | OUTPATIENT
Start: 2021-12-04 | End: 2021-12-08 | Stop reason: HOSPADM

## 2021-12-04 RX ORDER — LIDOCAINE HYDROCHLORIDE AND EPINEPHRINE 10; 10 MG/ML; UG/ML
20 INJECTION, SOLUTION INFILTRATION; PERINEURAL ONCE
Status: COMPLETED | OUTPATIENT
Start: 2021-12-04 | End: 2021-12-04

## 2021-12-04 RX ORDER — DEXAMETHASONE 4 MG/1
10 TABLET ORAL DAILY
Status: DISCONTINUED | OUTPATIENT
Start: 2021-12-05 | End: 2021-12-08 | Stop reason: HOSPADM

## 2021-12-04 RX ORDER — DEXAMETHASONE 4 MG/1
6 TABLET ORAL DAILY
Status: DISCONTINUED | OUTPATIENT
Start: 2021-12-05 | End: 2021-12-04

## 2021-12-04 RX ORDER — ONDANSETRON 4 MG/1
4 TABLET, ORALLY DISINTEGRATING ORAL EVERY 8 HOURS PRN
Status: DISCONTINUED | OUTPATIENT
Start: 2021-12-04 | End: 2021-12-08 | Stop reason: HOSPADM

## 2021-12-04 RX ORDER — DEXTROSE MONOHYDRATE 25 G/50ML
INJECTION, SOLUTION INTRAVENOUS
Status: DISCONTINUED
Start: 2021-12-04 | End: 2021-12-04 | Stop reason: HOSPADM

## 2021-12-04 RX ORDER — ONDANSETRON 2 MG/ML
4 INJECTION INTRAMUSCULAR; INTRAVENOUS ONCE
Status: COMPLETED | OUTPATIENT
Start: 2021-12-04 | End: 2021-12-04

## 2021-12-04 RX ORDER — VITAMIN B COMPLEX
2000 TABLET ORAL DAILY
Status: DISCONTINUED | OUTPATIENT
Start: 2021-12-12 | End: 2021-12-08 | Stop reason: HOSPADM

## 2021-12-04 RX ORDER — SODIUM CHLORIDE 9 MG/ML
25 INJECTION, SOLUTION INTRAVENOUS PRN
Status: DISCONTINUED | OUTPATIENT
Start: 2021-12-04 | End: 2021-12-08 | Stop reason: HOSPADM

## 2021-12-04 RX ORDER — 0.9 % SODIUM CHLORIDE 0.9 %
1000 INTRAVENOUS SOLUTION INTRAVENOUS ONCE
Status: COMPLETED | OUTPATIENT
Start: 2021-12-04 | End: 2021-12-04

## 2021-12-04 RX ORDER — ACETAMINOPHEN 650 MG/1
650 SUPPOSITORY RECTAL EVERY 6 HOURS PRN
Status: DISCONTINUED | OUTPATIENT
Start: 2021-12-04 | End: 2021-12-08 | Stop reason: HOSPADM

## 2021-12-04 RX ORDER — SODIUM CHLORIDE 0.9 % (FLUSH) 0.9 %
5-40 SYRINGE (ML) INJECTION PRN
Status: DISCONTINUED | OUTPATIENT
Start: 2021-12-04 | End: 2021-12-08 | Stop reason: HOSPADM

## 2021-12-04 RX ORDER — POLYETHYLENE GLYCOL 3350 17 G/17G
17 POWDER, FOR SOLUTION ORAL DAILY PRN
Status: DISCONTINUED | OUTPATIENT
Start: 2021-12-04 | End: 2021-12-08 | Stop reason: HOSPADM

## 2021-12-04 RX ADMIN — HYDROMORPHONE HYDROCHLORIDE 0.5 MG: 1 INJECTION, SOLUTION INTRAMUSCULAR; INTRAVENOUS; SUBCUTANEOUS at 15:44

## 2021-12-04 RX ADMIN — SODIUM CHLORIDE 1000 ML: 9 INJECTION, SOLUTION INTRAVENOUS at 12:30

## 2021-12-04 RX ADMIN — AMPICILLIN SODIUM AND SULBACTAM SODIUM 3000 MG: 2; 1 INJECTION, POWDER, FOR SOLUTION INTRAMUSCULAR; INTRAVENOUS at 15:53

## 2021-12-04 RX ADMIN — LIDOCAINE HYDROCHLORIDE,EPINEPHRINE BITARTRATE 20 ML: 10; .01 INJECTION, SOLUTION INFILTRATION; PERINEURAL at 20:47

## 2021-12-04 RX ADMIN — IOPAMIDOL 100 ML: 755 INJECTION, SOLUTION INTRAVENOUS at 13:44

## 2021-12-04 RX ADMIN — ONDANSETRON 4 MG: 2 INJECTION INTRAMUSCULAR; INTRAVENOUS at 15:43

## 2021-12-04 RX ADMIN — ENOXAPARIN SODIUM 30 MG: 100 INJECTION SUBCUTANEOUS at 23:09

## 2021-12-04 RX ADMIN — ACETAMINOPHEN 1000 MG: 500 TABLET, FILM COATED ORAL at 12:52

## 2021-12-04 RX ADMIN — AMPICILLIN SODIUM AND SULBACTAM SODIUM 3000 MG: 2; 1 INJECTION, POWDER, FOR SOLUTION INTRAMUSCULAR; INTRAVENOUS at 23:08

## 2021-12-04 RX ADMIN — IOPAMIDOL 50 ML: 755 INJECTION, SOLUTION INTRAVENOUS at 15:09

## 2021-12-04 RX ADMIN — DEXAMETHASONE SODIUM PHOSPHATE 10 MG: 10 INJECTION INTRAMUSCULAR; INTRAVENOUS at 15:45

## 2021-12-04 ASSESSMENT — PAIN SCALES - GENERAL
PAINLEVEL_OUTOF10: 10
PAINLEVEL_OUTOF10: 5
PAINLEVEL_OUTOF10: 10
PAINLEVEL_OUTOF10: 10

## 2021-12-04 ASSESSMENT — PAIN DESCRIPTION - PAIN TYPE
TYPE: ACUTE PAIN
TYPE: ACUTE PAIN

## 2021-12-04 ASSESSMENT — ENCOUNTER SYMPTOMS
ABDOMINAL DISTENTION: 0
COLOR CHANGE: 0
SORE THROAT: 1
EYE ITCHING: 0
SHORTNESS OF BREATH: 1
COUGH: 1

## 2021-12-04 ASSESSMENT — PAIN DESCRIPTION - LOCATION: LOCATION: THROAT

## 2021-12-04 NOTE — ED PROVIDER NOTES
[facility] ED  EMERGENCY DEPARTMENT ENCOUNTER      Pt Name: Charles Gore  MRN: [id number]  [place] 1980  Date of evaluation: 12/4/2021  Provider: Maynor Syed MD    CHIEF COMPLAINT     Chief Complaint   Patient presents with    Positive For Covid-19    Hemoptysis     started this am    Pharyngitis         HISTORY OF PRESENT ILLNESS   (Location/Symptom, Timing/Onset, Context/Setting,Quality, Duration, Modifying Factors, Severity)  Note limiting factors. Charles Gore is a 35 y.o. male who presents to the emergency department by EMS for evaluation of shortness of breath, generalized aches and pains and left-sided throat pain with surrounding radiation. Patient has been coughing and today when he brought up a small amount of bloody in his sputum he became concerned. Patient developed myalgias and feverishness 11 days ago and tested positive for Covid 5 days ago. He is unvaccinated for Covid. The history is provided by the patient. Nursing Notes werereviewed. REVIEW OF SYSTEMS    (2-9 systems for level 4, 10 or more for level 5)     Review of Systems   All other systems reviewed and are negative. Except as noted above the remainder of the review of systems was reviewed and negative. PAST MEDICAL HISTORY   History reviewed. No pertinent past medical history. SURGICALHISTORY       Past Surgical History:   Procedure Laterality Date    EKG 12-LEAD  1/4/2016         SINUS SURGERY           CURRENT MEDICATIONS       Previous Medications    IBUPROFEN (ADVIL;MOTRIN) 800 MG TABLET    Take 1 tablet by mouth every 8 hours as needed for Pain (with food)       ALLERGIES     Patient has no known allergies.     FAMILY HISTORY       Family History   Problem Relation Age of Onset    Other Mother     Heart Disease Father     High Blood Pressure Father     High Cholesterol Father     Cancer Father           SOCIAL HISTORY       Social History     Socioeconomic History commands  [place] Coma Scale Score: 15        PHYSICAL EXAM    (up to 7 for level 4, 8 or more for level 5)     ED Triage Vitals   BP Temp Temp Source Pulse Resp SpO2 Height Weight   12/04/21 1242 12/04/21 1250 12/04/21 1250 12/04/21 1242 12/04/21 1242 12/04/21 1242 12/04/21 1242 12/04/21 1242   (!) 144/96 100.6 °F (38.1 °C) Temporal 84 20 94 % 6' (1.829 m) 206 lb (93.4 kg)       Physical Exam  Vitals reviewed. Constitutional:       General: He is not in acute distress. Appearance: He is ill-appearing. HENT:      Head: Normocephalic. Mouth/Throat:      Mouth: Mucous membranes are moist.      Comments: Patient has a left peritonsillar swelling with shift of the uvula away from the midline. His airway is clear and he is not experiencing any stridor or hoarseness of voice. Eyes:      Conjunctiva/sclera: Conjunctivae normal.   Cardiovascular:      Rate and Rhythm: Normal rate and regular rhythm. Pulmonary:      Effort: Pulmonary effort is normal. No respiratory distress. Breath sounds: Normal breath sounds. Abdominal:      Palpations: Abdomen is soft. Tenderness: There is no abdominal tenderness. Musculoskeletal:      Cervical back: Normal range of motion. Skin:     General: Skin is warm and dry. Coloration: Skin is not pale. Findings: No erythema or rash. Neurological:      General: No focal deficit present. Mental Status: He is alert and oriented to person, place, and time. DIAGNOSTIC RESULTS     EKG: All EKG's are interpreted by the Emergency Department Physician who either signs orCo-signs this chart in the absence of a cardiologist.    RADIOLOGY:     Interpretation per the Radiologist below, ifavailable at the time of this note:    CT SOFT TISSUE NECK W CONTRAST   Final Result   Left peritonsillar abscess measuring 3.6 x 1.9 x 1.9 cm. Small bilateral upper lobe infiltrates may represent multifocal pneumonia. COVID-19 pneumonia is a possibility. CT CHEST PULMONARY EMBOLISM W CONTRAST   Final Result   Ground-glass opacity is seen throughout the lungs bilaterally consistent with   atypical pneumonia. No acute or chronic pulmonary embolism. ED BEDSIDE ULTRASOUND:   Performed by ED Physician - none    LABS:  Labs Reviewed   CBC WITH AUTO DIFFERENTIAL - Abnormal; Notable for the following components:       Result Value    Seg Neutrophils 80 (*)     Lymphocytes 13 (*)     Eosinophils % 0 (*)     Absolute Lymph # 0.72 (*)     All other components within normal limits   BASIC METABOLIC PANEL W/ REFLEX TO MG FOR LOW K - Abnormal; Notable for the following components:    Glucose 125 (*)     All other components within normal limits   D-DIMER, QUANTITATIVE - Abnormal; Notable for the following components:    D-Dimer, Quant 1.24 (*)     All other components within normal limits   CULTURE, BLOOD 1   CULTURE, BLOOD 1   HEPATIC FUNCTION PANEL   BRAIN NATRIURETIC PEPTIDE   TROPONIN   TROPONIN   LACTATE, SEPSIS   LACTATE, SEPSIS       All other labs were within normal range ornot returned as of this dictation. EMERGENCY DEPARTMENT COURSE and DIFFERENTIAL DIAGNOSIS/MDM:   Vitals:    Vitals:    12/04/21 1400 12/04/21 1530 12/04/21 1600 12/04/21 1630   BP: 130/85 (!) 122/92 131/84 122/79   Pulse:    67   Resp:    18   Temp:       TempSrc:       SpO2:  92% 96% 94%   Weight:       Height:           ED Course as of 12/04/21 1814   Sat Dec 04, 2021   1812 Patient has been accepted at [address] [place] [address] emergency department by Dr. Blanca Feldman. I also spoke with Dr. Kwame Barrett on-call for ENT who is agreeable to draining his abscess in the emergency department. Patient may require hospitalization after the procedure. He was initially maintaining a stable oxygen saturation of about 94% on room air but his oxygen saturations have started dropping and he has been placed on supplemental oxygen at 2 L a minute right now.  [SH]      ED Course User Index  [SH] Kim Cui MD       Patient has received an initial dose of Unasyn 3 g IV and Decadron 10 mg IV. CT scan of the neck showed a large left peritonsillar abscess without evidence of airway compromise. MDM    CRITICAL CARE TIME   Total Critical Caretime was 45 minutes, excluding separately reportable procedures. There was a high probability of clinically significant/life threatening deterioration in the patient's condition which required my urgent intervention. CONSULTS:  None    PROCEDURES:  Unlessotherwise noted below, none     Procedures    FINAL IMPRESSION      1. Peritonsillar abscess    2. Pneumonia due to COVID-19 virus          DISPOSITION/PLAN   DISPOSITION Decision To Transfer 12/04/2021 06:12:57 PM      PATIENT REFERRED TO:  No follow-up provider specified. DISCHARGE MEDICATIONS:         Problem List:  Patient Active Problem List   Diagnosis Code    Peritonsillar abscess J36    Tobacco abuse Z72.0    Tachycardia R00.0    Strep pharyngitis J02.0           Summation      Patient Course: Transferred    ED Medicationsadministered this visit:    Medications   dextrose 50 % solution (  Not Given 12/4/21 1553)   acetaminophen (TYLENOL) tablet 1,000 mg (1,000 mg Oral Given 12/4/21 1252)   0.9 % sodium chloride bolus (0 mLs IntraVENous Stopped 12/4/21 1300)   iopamidol (ISOVUE-370) 76 % injection 100 mL (100 mLs IntraVENous Given 12/4/21 1344)   iopamidol (ISOVUE-370) 76 % injection 50 mL (50 mLs IntraVENous Given 12/4/21 1509)   ampicillin-sulbactam (UNASYN) 3000 mg ivpb minibag (0 mg IntraVENous Stopped 12/4/21 1625)   dexamethasone (DECADRON) injection 10 mg (10 mg IntraVENous Given 12/4/21 1545)   ondansetron (ZOFRAN) injection 4 mg (4 mg IntraVENous Given 12/4/21 1543)   HYDROmorphone (DILAUDID) injection 0.5 mg (0.5 mg IntraVENous Given 12/4/21 1544)       New Prescriptions from this visit:    New Prescriptions    No medications on file       Follow-up:  No follow-up provider specified. Final Impression:   1. Peritonsillar abscess    2.  Pneumonia due to COVID-19 virus               (Please note that portions of this note were completed with a voice recognitionprogram.  Efforts were made to edit the dictations but occasionally words are mis-transcribed.)    Scott Souza MD (electronically signed)  Attending Emergency Physician            Scott Souza MD  12/04/21 [id number]

## 2021-12-04 NOTE — LETTER
[facility]  [address]  Phone: [phone]            December 8, 2021     Patient: Annette Rodriguez   YOB: 1980   Date of Visit: 12/4/2021       To Whom It May Concern: It is my medical opinion that Richard Chino should remain out of work until 12/13/2021. If you have any questions or concerns, please don't hesitate to call.     Sincerely,        Dr Torey Howard

## 2021-12-05 LAB
ABSOLUTE EOS #: 0 K/UL (ref 0–0.44)
ABSOLUTE IMMATURE GRANULOCYTE: 0 K/UL (ref 0–0.3)
ABSOLUTE LYMPH #: 1.03 K/UL (ref 1.1–3.7)
ABSOLUTE MONO #: 0.55 K/UL (ref 0.1–1.2)
ALBUMIN SERPL-MCNC: 3.7 G/DL (ref 3.5–5.2)
ALBUMIN/GLOBULIN RATIO: 1.3 (ref 1–2.5)
ALP BLD-CCNC: 67 U/L (ref 40–129)
ALT SERPL-CCNC: 15 U/L (ref 5–41)
ANION GAP SERPL CALCULATED.3IONS-SCNC: 10 MMOL/L (ref 9–17)
AST SERPL-CCNC: 23 U/L
BASOPHILS # BLD: 0 % (ref 0–2)
BASOPHILS ABSOLUTE: 0 K/UL (ref 0–0.2)
BILIRUB SERPL-MCNC: 0.48 MG/DL (ref 0.3–1.2)
BUN BLDV-MCNC: 16 MG/DL (ref 6–20)
BUN/CREAT BLD: ABNORMAL (ref 9–20)
C-REACTIVE PROTEIN: 92.4 MG/L (ref 0–5)
CALCIUM SERPL-MCNC: 9.2 MG/DL (ref 8.6–10.4)
CHLORIDE BLD-SCNC: 101 MMOL/L (ref 98–107)
CO2: 28 MMOL/L (ref 20–31)
CREAT SERPL-MCNC: 0.82 MG/DL (ref 0.7–1.2)
DIFFERENTIAL TYPE: ABNORMAL
EKG ATRIAL RATE: 83 BPM
EKG P AXIS: 39 DEGREES
EKG P-R INTERVAL: 160 MS
EKG Q-T INTERVAL: 360 MS
EKG QRS DURATION: 92 MS
EKG QTC CALCULATION (BAZETT): 423 MS
EKG R AXIS: 84 DEGREES
EKG T AXIS: 15 DEGREES
EKG VENTRICULAR RATE: 83 BPM
EOSINOPHILS RELATIVE PERCENT: 0 % (ref 1–4)
FERRITIN: 1050 UG/L (ref 30–400)
FIBRINOGEN: 604 MG/DL (ref 140–420)
GFR AFRICAN AMERICAN: >60 ML/MIN
GFR NON-AFRICAN AMERICAN: >60 ML/MIN
GFR SERPL CREATININE-BSD FRML MDRD: ABNORMAL ML/MIN/{1.73_M2}
GFR SERPL CREATININE-BSD FRML MDRD: ABNORMAL ML/MIN/{1.73_M2}
GLUCOSE BLD-MCNC: 116 MG/DL (ref 70–99)
HCT VFR BLD CALC: 45.6 % (ref 40.7–50.3)
HEMOGLOBIN: 15.4 G/DL (ref 13–17)
IMMATURE GRANULOCYTES: 0 %
LYMPHOCYTES # BLD: 13 % (ref 24–43)
MCH RBC QN AUTO: 30.8 PG (ref 25.2–33.5)
MCHC RBC AUTO-ENTMCNC: 33.8 G/DL (ref 28.4–34.8)
MCV RBC AUTO: 91.2 FL (ref 82.6–102.9)
MONOCYTES # BLD: 7 % (ref 3–12)
MORPHOLOGY: NORMAL
NRBC AUTOMATED: 0 PER 100 WBC
PDW BLD-RTO: 11.9 % (ref 11.8–14.4)
PLATELET # BLD: 204 K/UL (ref 138–453)
PLATELET ESTIMATE: ABNORMAL
PMV BLD AUTO: 9.9 FL (ref 8.1–13.5)
POTASSIUM SERPL-SCNC: 4.8 MMOL/L (ref 3.7–5.3)
RBC # BLD: 5 M/UL (ref 4.21–5.77)
RBC # BLD: ABNORMAL 10*6/UL
SEG NEUTROPHILS: 80 % (ref 36–65)
SEGMENTED NEUTROPHILS ABSOLUTE COUNT: 6.32 K/UL (ref 1.5–8.1)
SODIUM BLD-SCNC: 139 MMOL/L (ref 135–144)
TOTAL PROTEIN: 6.6 G/DL (ref 6.4–8.3)
VITAMIN D 25-HYDROXY: 36 NG/ML (ref 30–100)
WBC # BLD: 7.9 K/UL (ref 3.5–11.3)
WBC # BLD: ABNORMAL 10*3/UL

## 2021-12-05 PROCEDURE — 80053 COMPREHEN METABOLIC PANEL: CPT

## 2021-12-05 PROCEDURE — 36415 COLL VENOUS BLD VENIPUNCTURE: CPT

## 2021-12-05 PROCEDURE — 6360000002 HC RX W HCPCS: Performed by: INTERNAL MEDICINE

## 2021-12-05 PROCEDURE — 82306 VITAMIN D 25 HYDROXY: CPT

## 2021-12-05 PROCEDURE — 99233 SBSQ HOSP IP/OBS HIGH 50: CPT | Performed by: INTERNAL MEDICINE

## 2021-12-05 PROCEDURE — 99232 SBSQ HOSP IP/OBS MODERATE 35: CPT | Performed by: INTERNAL MEDICINE

## 2021-12-05 PROCEDURE — 2060000000 HC ICU INTERMEDIATE R&B

## 2021-12-05 PROCEDURE — 85384 FIBRINOGEN ACTIVITY: CPT

## 2021-12-05 PROCEDURE — 2700000000 HC OXYGEN THERAPY PER DAY

## 2021-12-05 PROCEDURE — 6360000002 HC RX W HCPCS: Performed by: NURSE PRACTITIONER

## 2021-12-05 PROCEDURE — 85025 COMPLETE CBC W/AUTO DIFF WBC: CPT

## 2021-12-05 PROCEDURE — 6370000000 HC RX 637 (ALT 250 FOR IP): Performed by: INTERNAL MEDICINE

## 2021-12-05 PROCEDURE — 82728 ASSAY OF FERRITIN: CPT

## 2021-12-05 PROCEDURE — 6370000000 HC RX 637 (ALT 250 FOR IP): Performed by: NURSE PRACTITIONER

## 2021-12-05 PROCEDURE — 2580000003 HC RX 258: Performed by: NURSE PRACTITIONER

## 2021-12-05 PROCEDURE — 99024 POSTOP FOLLOW-UP VISIT: CPT | Performed by: OTOLARYNGOLOGY

## 2021-12-05 PROCEDURE — 86140 C-REACTIVE PROTEIN: CPT

## 2021-12-05 PROCEDURE — 2580000003 HC RX 258: Performed by: INTERNAL MEDICINE

## 2021-12-05 RX ADMIN — SODIUM CHLORIDE, PRESERVATIVE FREE 10 ML: 5 INJECTION INTRAVENOUS at 08:30

## 2021-12-05 RX ADMIN — AMPICILLIN SODIUM AND SULBACTAM SODIUM 3000 MG: 2; 1 INJECTION, POWDER, FOR SOLUTION INTRAMUSCULAR; INTRAVENOUS at 21:08

## 2021-12-05 RX ADMIN — Medication 6000 UNITS: at 08:30

## 2021-12-05 RX ADMIN — AMPICILLIN SODIUM AND SULBACTAM SODIUM 3000 MG: 2; 1 INJECTION, POWDER, FOR SOLUTION INTRAMUSCULAR; INTRAVENOUS at 08:29

## 2021-12-05 RX ADMIN — SODIUM CHLORIDE, PRESERVATIVE FREE 10 ML: 5 INJECTION INTRAVENOUS at 21:09

## 2021-12-05 RX ADMIN — ENOXAPARIN SODIUM 30 MG: 100 INJECTION SUBCUTANEOUS at 08:29

## 2021-12-05 RX ADMIN — BARICITINIB 4 MG: 2 TABLET, FILM COATED ORAL at 21:09

## 2021-12-05 RX ADMIN — DEXAMETHASONE 10 MG: 4 TABLET ORAL at 08:29

## 2021-12-05 RX ADMIN — AMPICILLIN SODIUM AND SULBACTAM SODIUM 3000 MG: 2; 1 INJECTION, POWDER, FOR SOLUTION INTRAMUSCULAR; INTRAVENOUS at 14:30

## 2021-12-05 RX ADMIN — ENOXAPARIN SODIUM 30 MG: 100 INJECTION SUBCUTANEOUS at 21:09

## 2021-12-05 ASSESSMENT — ENCOUNTER SYMPTOMS
SHORTNESS OF BREATH: 1
ABDOMINAL DISTENTION: 0
SORE THROAT: 1
VOICE CHANGE: 0
BACK PAIN: 1
ABDOMINAL PAIN: 0
TROUBLE SWALLOWING: 1
BLOOD IN STOOL: 0
VOMITING: 0
EYE ITCHING: 0
WHEEZING: 0
COUGH: 0
STRIDOR: 0
DIARRHEA: 0
CONSTIPATION: 0
COLOR CHANGE: 0
NAUSEA: 0
NAUSEA: 1
COUGH: 1

## 2021-12-05 ASSESSMENT — PAIN SCALES - GENERAL
PAINLEVEL_OUTOF10: 0
PAINLEVEL_OUTOF10: 1
PAINLEVEL_OUTOF10: 0
PAINLEVEL_OUTOF10: 0

## 2021-12-05 ASSESSMENT — PAIN DESCRIPTION - PAIN TYPE: TYPE: ACUTE PAIN

## 2021-12-05 ASSESSMENT — PAIN DESCRIPTION - LOCATION: LOCATION: THROAT

## 2021-12-05 NOTE — CONSULTS
[facility] -   Infectious diseases evaluation  admission date 12/4/2021    reason for consultation:   covid and tonsillitis    Impression :   Current:  · covid diffuse pneumonia   · Left tonsillar edema -abscess 3.6 - 1.9 - 1.9 cm  · Drained 12/4 per ENT    Other:  ·   Discussion / summary of stay / plan of care   ·   Recommendations   · Ok for unasyn  · Decadron 10 days - 10 mg  · ENT - did drain the abscess on the bedside, lot of pus, cx pend  · Decide on barcitinib per tonsillar response to AB and per covid condition  · Will follow CRP ferritin    Infection Control Recommendations   · [place] Precautions  · Contact Isolation   · Airborne isolation  · Droplet Isolation      Antimicrobial Stewardship Recommendations   · Simplification of therapy  · Targeted therapy  Coordination ofOutpatient Care:   · Estimated Length of IV antimicrobials:  · Patient will need Midline / picc Catheter Insertion:   · Patient will need SNF:  · Patient will need outpatient wound care:     History of Present Illness:   Initial history:  Louise Qureshi is a 35y.o.-year-old male w a week cough and aches,  Pains sore throat, coughed blood today and came in - ill x 11 days - CT chest covid diffuse pneumonia   covid + 5 days PTA  Non vaccinated  Exam left tonsillar edema w uvula shift. No exudates seen - drained by ENt in ER lots of pus  Sent for cx        Interval changes  12/4/2021   Patient Vitals for the past 8 hrs:   BP Temp Temp src Pulse Resp SpO2   12/04/21 2145 133/89 -- -- 72 23 95 %   12/04/21 2000 (!) 142/89 99.2 °F (37.3 °C) Oral 67 22 97 %       Summary of relevant labs:  Labs:  W 5.4  CRP  Ferritin    Micro:  covid +  Imaging:  CT chest diffuse covid    I have personally reviewed the past medical history, past surgical history, medications, social history, and family history, and I haveupdated the database accordingly. Allergies:   Patient has no known allergies. Review of Systems:     Review of Systems   Constitutional: Positive for activity change. HENT: Positive for sore throat. Negative for congestion. Eyes: Negative for itching. Respiratory: Positive for cough and shortness of breath. Gastrointestinal: Negative for abdominal distention. Endocrine: Negative for heat intolerance. Genitourinary: Negative for dysuria. Musculoskeletal: Negative for arthralgias. Skin: Negative for color change. Allergic/Immunologic: Negative for immunocompromised state. Neurological: Negative for dizziness. Hematological: Negative for adenopathy. Psychiatric/Behavioral: Negative for agitation. Physical Examination :       Physical Exam  Constitutional:       Appearance: Normal appearance. He is normal weight. HENT:      Head: Normocephalic and atraumatic. Nose: Nose normal.      Mouth/Throat:      Mouth: Mucous membranes are moist.      Pharynx: Posterior oropharyngeal erythema present. Eyes:      General: No scleral icterus. Conjunctiva/sclera: Conjunctivae normal.   Cardiovascular:      Rate and Rhythm: Normal rate and regular rhythm. Heart sounds: Normal heart sounds. No murmur heard. Pulmonary:      Breath sounds: No stridor. No rhonchi. Abdominal:      General: There is no distension. Palpations: Abdomen is soft. Tenderness: There is no abdominal tenderness. Genitourinary:     Comments: No vail  Musculoskeletal:         General: No swelling or deformity. Cervical back: Neck supple. No rigidity. Skin:     Coloration: Skin is not jaundiced. Findings: No bruising. Neurological:      General: No focal deficit present. Mental Status: He is alert and oriented to person, place, and time. Psychiatric:         Mood and Affect: Mood normal.         Thought Content: Thought content normal.         Past Medical History:   No past medical history on file.     Past Surgical  History:     Past Surgical History:   Procedure Laterality Date    EKG 12-LEAD  1/4/2016         SINUS SURGERY         Medications:      lidocaine-EPINEPHrine  20 mL IntraDERmal Once    sodium chloride flush  5-40 mL IntraVENous 2 times per day    enoxaparin  30 mg SubCUTAneous BID    dexamethasone  6 mg Oral Daily    [START ON 12/5/2021] Vitamin D  6,000 Units Oral Daily    Followed by   [facility] [place] ON 12/12/2021] Vitamin D  2,000 Units Oral Daily    ampicillin-sulbactam  1,500 mg IntraVENous Q6H       Social History:     Social History     Socioeconomic History    Marital status:      Spouse name: Not on file    Number of children: Not on file    Years of education: Not on file    Highest education level: Not on file   Occupational History    Not on file   Tobacco Use    Smoking status: Light Tobacco Smoker     Packs/day: 0.25     Start date: 11/18/2012    Smokeless tobacco: Never Used   Substance and Sexual Activity    Alcohol use: Yes     Comment: occasional    Drug use: No    Sexual activity: Yes     Partners: Female   Other Topics Concern    Not on file   Social History Narrative    Not on file     Social Determinants of Health     Financial Resource Strain: Low Risk     Difficulty of Paying Living Expenses: Not hard at all   Food Insecurity: No Food Insecurity    Worried About Running Out of Food in the Last Year: Never true    Lakisha of Food in the Last Year: Never true   Transportation Needs:     Lack of Transportation (Medical): Not on file    Lack of Transportation (Non-Medical):  Not on file   Physical Activity:     Days of Exercise per Week: Not on file    Minutes of Exercise per Session: Not on file   Stress:     Feeling of Stress : Not on file   Social Connections:     Frequency of Communication with Friends and Family: Not on file    Frequency of Social Gatherings with Friends and Family: Not on file    Attends Rastafarian Services: Not on file    Active Member of Clubs or Organizations: Not on file    Attends Club or Organization Meetings: Not on file    Marital Status: Not on file   Intimate Partner Violence:     Fear of Current or Ex-Partner: Not on file    Emotionally Abused: Not on file    Physically Abused: Not on file    Sexually Abused: Not on file   Housing Stability:     Unable to Pay for Housing in the Last Year: Not on file    Number of [place] in the Last Year: Not on file    Unstable Housing in the Last Year: Not on file       Family History:     Family History   Problem Relation Age of Onset    Other Mother     Heart Disease Father     High Blood Pressure Father     High Cholesterol Father     Cancer Father       Medical Decision Making:   I have independently reviewed/ordered the following labs:    CBC with Differential:   Recent Labs     12/04/21  1242   WBC 5.4   HGB 15.0   HCT 45.0      LYMPHOPCT 13*   MONOPCT 7     BMP:  Recent Labs     12/04/21  1249      K 3.9      CO2 28   BUN 13   CREATININE 0.93     Hepatic Function Panel:   Recent Labs     12/04/21  1249   PROT 6.5   LABALBU 3.7   BILIDIR 0.16   IBILI 0.24   BILITOT 0.40   ALKPHOS 70   ALT 20   AST 26     No results for input(s): RPR in the last 72 hours. No results for input(s): HIV in the last 72 hours. No results for input(s): BC in the last 72 hours. Lab Results   Component Value Date    CREATININE 0.93 12/04/2021    GLUCOSE 125 12/04/2021       Detailed results: Thank you for allowing us to participate in the care of this patient. Please call with questions. This note is created with the assistance of a speech recognition program.  While intending to generate adocument that actually reflects the content of the visit, the document can still have some errors including those of syntax and sound a like substitutions which may escape proof reading. It such instances, actual meaningcan be extrapolated by contextual diversion.     Elijah Penaloza MD  Office: [phone]  Perfect serve / office [phone]

## 2021-12-05 NOTE — CONSULTS
ENT/OTOLARYNGOLOGY PROGRESS NOTE     REASON FOR CARE: Left peritonsillar abscess and COVID pneumonia     HISTORY OF PRESENT ILLNESS:   Jesus Almonte is a 35 y.o. who is being seen for follow-up of incision and drainage of left peritonsillar abscess last night. This morning he reports that he has had significant improvement in swallowing and left sided throat pain. He has been drinking well and feels hungry today. PAST MEDICAL HISTORY:   He  has no past medical history on file. PAST SURGICAL HISTORY:   He  has a past surgical history that includes sinus surgery and EKG 12 Lead (1/4/2016). FAMILY HISTORY:   No family history related to presenting problem. SOCIAL HISTORY:   No social history related to presenting problem. MEDICATIONS:   As reviewed in the Medication Activity. ALLERGIES:   No Known Allergies          PHYSICAL EXAM:      Vitals:    12/05/21 0300 12/05/21 0550 12/05/21 0600 12/05/21 0800   BP: 122/67 120/87  120/81   Pulse: (!) 49 66     Resp: 20 16     Temp:  98.2 °F (36.8 °C)  98.8 °F (37.1 °C)   TempSrc:  Oral  Oral   SpO2: 98% 92%     Weight:   201 lb 1.6 oz (91.2 kg)    Height:   6' (1.829 m)         GENERAL: well developed and well nourished and in no acute distress  HEAD: normocephalic and atraumatic  EYES: no eyelid swelling, no conjunctival injection or exudate, pupils equal round and reactive to light  NOSE: nares patent, normal mucosa  MOUTH/THROAT: There is mild left sided tonsillar pillar erythema, improved with improved edema and uvular deviation. Trismus is resolved. NECK: non-tender, full range of motion, no mass, no focal lymphadenopathy  RESPIRATORY: Symmetric effort with no retractions.  On 4 liters nasal cannula  NEUROLOGICAL:  cranial nerves II-XII are grossly intact     RELEVANT LABS/STUDIES:      Left peritonsillar abscess fluid culture pending     IMPRESSION AND RECOMMENDATIONS:     35year old male with COVID pneumonia and status post drainage of left peritonsillar abscess. Plan:  - Continue IV antibiotics- Unasyn is appropriate, but can modify based on culture results. Would continue antibiotics for 10 days total.  - From ENT standpoint, patient can eat regular diet as tolerated.   - Please call with questions/ change in patient condition.         --------------------------------------------------  MD CHAYO Christina [facility]  Office  ph# [phone]    Also available in [facility]

## 2021-12-05 NOTE — H&P
[facility]  Office: [address], DO, Mervin Cuadra, DO, Amna Farley, DO, Aminata Dale Blood, DO, Tisha Griffin MD, Rosy Rasmussen MD, Anny Banks MD, Leatha Rojas MD, Bowen Hernandez MD, Shine Salcedo MD, Asa Wesley MD, Kristan Leyva, DO, Marilyn Cheng, DO, Vivek Guzman MD,  Vita Mueller DO, Amada Jones MD, Matthew Tripp MD, Jonnie Butler MD, Pravin Carranza MD, Kelly Schultz MD, Vipul Gonzalez MD, Lea Arizmendi MD, Jasmyn Alvarado, [facility], [facility] Ernestofredy, CNP, Jamison Can, CNP, Leodan Hay, CNS, Shari De Los Santos, CNP, Masha Roberts, CNP, Osito Sorto, CNP, Beto Bender, CNP, Romain Andrea, CNP, Onofre Camargo PA-C, Margaret Antunez, [facility], Courtney Galo, CNP, Dolores Castaneda, CNP, Gabriel Ferrari, CNP, Ajay Montiel, CNP, Lalit Rodriguez, CNP, Radha Walton, CNP, Leonora Brown, CNP         [facility]    HISTORY AND PHYSICAL EXAMINATION            Date:   12/4/2021  Patient name:  Brenda Garcia  Date of admission:  12/4/2021  7:54 PM  MRN:   [id number]  Account:  [de-identified]  YOB: 1980  PCP:    Ada Guo  Room:   49PED/49PED  Code Status:    Prior    Chief Complaint:     Chief Complaint   Patient presents with    Abscess     Tx from [place] ED to see ENT for a peritonsilar abscess. No airway compromise. Covid + on o2 for hypoxia into the high 80s on room air. History Obtained From:     patient, electronic medical record    History of Present Illness:     Brenda Garcia is a 35 y.o. Non- / non  male who presents with Abscess (Tx from [place] ED to see ENT for a peritonsilar abscess. No airway compromise. Covid + on o2 for hypoxia into the high 80s on room air. )    Patient presents to [address] [place] [address] ED with a positive COVID Exposure, sore throat and hemoptysis.   This fully vaccinated patient states that he originally started with myalgia and back pain nearly 11 days prior and sore throat (improving) and trouble swallowing (improving). Negative for congestion, hearing loss and voice change. Respiratory: Positive for cough and shortness of breath. Negative for wheezing and stridor. Cardiovascular: Negative for chest pain, palpitations and leg swelling. Gastrointestinal: Negative for abdominal pain, blood in stool, constipation, diarrhea, nausea and vomiting. Genitourinary: Negative for dysuria and frequency. Musculoskeletal: Positive for back pain and myalgias. Skin: Negative for rash. Neurological: Negative for dizziness, seizures and headaches. Psychiatric/Behavioral: The patient is not nervous/anxious. Physical Exam:   BP (!) 142/89   Pulse 67   Temp 99.2 °F (37.3 °C) (Oral)   Resp 22   SpO2 97%   Temp (24hrs), Av.9 °F (37.7 °C), Min:99.2 °F (37.3 °C), Max:100.6 °F (38.1 °C)    No results for input(s): POCGLU in the last 72 hours. No intake or output data in the 24 hours ending 21 [id number]    Physical Exam  Vitals and nursing note reviewed. Constitutional:       General: He is not in acute distress. Appearance: He is well-developed. He is ill-appearing. He is not diaphoretic. HENT:      Head: Normocephalic and atraumatic. Right Ear: Hearing normal.      Left Ear: Hearing normal.      Nose: Nose normal. No rhinorrhea. Eyes:      General: Lids are normal.      Extraocular Movements:      Right eye: Normal extraocular motion. Left eye: Normal extraocular motion. Conjunctiva/sclera: Conjunctivae normal.      Right eye: Right conjunctiva is not injected. Left eye: Left conjunctiva is not injected. Pupils: Pupils are equal, round, and reactive to light. Pupils are equal.      Right eye: Pupil is reactive. Left eye: Pupil is reactive. Neck:      Thyroid: No thyromegaly. Vascular: No carotid bruit. Trachea: Trachea and phonation normal. No tracheal deviation.    Cardiovascular:      Rate and Rhythm: Regular rhythm. Bradycardia present. Pulses: Normal pulses. Heart sounds: Normal heart sounds. No murmur heard. Comments: Hr at rest is about 64- will  with movement or stimulation   Pulmonary:      Effort: Pulmonary effort is normal. No respiratory distress. Breath sounds: No stridor. Examination of the left-middle field reveals decreased breath sounds. Examination of the right-lower field reveals decreased breath sounds. Examination of the left-lower field reveals decreased breath sounds. Decreased breath sounds present. Abdominal:      General: Bowel sounds are normal. There is no distension. Palpations: Abdomen is soft. There is no mass. Tenderness: There is no abdominal tenderness. There is no guarding. Musculoskeletal:         General: No tenderness. Cervical back: Neck supple. Skin:     General: Skin is warm and dry. Findings: No erythema, lesion or rash. Neurological:      General: No focal deficit present. Mental Status: He is alert and oriented to person, place, and time. He is not disoriented. GCS: GCS eye subscore is 4. GCS verbal subscore is 5. GCS motor subscore is 6. Cranial Nerves: No cranial nerve deficit. Sensory: No sensory deficit. Psychiatric:         Speech: Speech normal.         Behavior: Behavior normal. Behavior is cooperative.          Investigations:      Laboratory Testing:  Recent Results (from the past 24 hour(s))   CBC Auto Differential    Collection Time: 12/04/21 12:42 PM   Result Value Ref Range    WBC 5.4 3.5 - 11.3 k/uL    RBC 4.88 4.21 - 5.77 m/uL    Hemoglobin 15.0 13.0 - 17.0 g/dL    Hematocrit 45.0 40.7 - 50.3 %    MCV 92.2 82.6 - 102.9 fL    MCH 30.7 25.2 - 33.5 pg    MCHC 33.3 25.2 - 33.5 g/dL    RDW 11.9 11.8 - 14.4 %    Platelets 074 410 - 798 k/uL    MPV 9.7 8.1 - 13.5 fL    NRBC Automated 0.0 0.0 per 100 WBC    Differential Type NOT REPORTED     Seg Neutrophils 80 (H) 36 - 65 %    Lymphocytes 13 (L) 24 - 43 %    Monocytes 7 3 - 12 %    Eosinophils % 0 (L) 1 - 4 %    Basophils 0 0 - 2 %    Immature Granulocytes 0 0 %    Segs Absolute 4.27 1.50 - 8.10 k/uL    Absolute Lymph # 0.72 (L) 1.10 - 3.70 k/uL    Absolute Mono # 0.36 0.10 - 1.20 k/uL    Absolute Eos # <0.03 0.00 - 0.44 k/uL    Basophils Absolute <0.03 0.00 - 0.20 k/uL    Absolute Immature Granulocyte <0.03 0.00 - 0.30 k/uL    WBC Morphology NOT REPORTED     RBC Morphology NOT REPORTED     Platelet Estimate NOT REPORTED    EKG 12 Lead    Collection Time: 12/04/21 12:47 PM   Result Value Ref Range    Ventricular Rate 83 BPM    Atrial Rate 83 BPM    P-R Interval 160 ms    QRS Duration 92 ms    Q-T Interval 360 ms    QTc Calculation (Bazett) 423 ms    P Axis 39 degrees    R Axis 84 degrees    T Axis 15 degrees   Basic Metabolic Panel w/ Reflex to MG    Collection Time: 12/04/21 12:49 PM   Result Value Ref Range    Glucose 125 (H) 70 - 99 mg/dL    BUN 13 6 - 20 mg/dL    CREATININE 0.93 0.70 - 1.20 mg/dL    Bun/Cre Ratio 14 9 - 20    Calcium 8.7 8.6 - 10.4 mg/dL    Sodium 137 135 - 144 mmol/L    Potassium 3.9 3.7 - 5.3 mmol/L    Chloride 100 98 - 107 mmol/L    CO2 28 20 - 31 mmol/L    Anion Gap 9 9 - 17 mmol/L    GFR Non-African American >60 >60 mL/min    GFR African American >60 >60 mL/min    GFR Comment          GFR Staging NOT REPORTED    Hepatic Function Panel    Collection Time: 12/04/21 12:49 PM   Result Value Ref Range    Albumin 3.7 3.5 - 5.2 g/dL    Alkaline Phosphatase 70 40 - 129 U/L    ALT 20 5 - 41 U/L    AST 26 <40 U/L    Total Bilirubin 0.40 0.3 - 1.2 mg/dL    Bilirubin, Direct 0.16 <0.31 mg/dL    Bilirubin, Indirect 0.24 0.00 - 1.00 mg/dL    Total Protein 6.5 6.4 - 8.3 g/dL    Globulin 2.8 1.5 - 3.8 g/dL    Albumin/Globulin Ratio 1.3 1.0 - 2.5   Brain Natriuretic Peptide    Collection Time: 12/04/21 12:49 PM   Result Value Ref Range    Pro-BNP 86 <300 pg/mL    BNP Interpretation NOT REPORTED    Troponin    Collection Time: 12/04/21 12:49 PM Result Value Ref Range    Troponin, High Sensitivity <6 0 - 22 ng/L    Troponin T NOT REPORTED <0.03 ng/mL    Troponin Interp NOT REPORTED    D-Dimer, Quantitative    Collection Time: 12/04/21 12:49 PM   Result Value Ref Range    D-Dimer, Quant 1.24 (H) 0.00 - 0.59 mg/L FEU   Lactate, Sepsis    Collection Time: 12/04/21 12:49 PM   Result Value Ref Range    Lactic Acid, Sepsis 1.3 0.5 - 1.9 mmol/L    Lactic Acid, Sepsis, Whole Blood NOT REPORTED 0.5 - 1.9 mmol/L   Troponin    Collection Time: 12/04/21  2:44 PM   Result Value Ref Range    Troponin, High Sensitivity 6 0 - 22 ng/L    Troponin T NOT REPORTED <0.03 ng/mL    Troponin Interp NOT REPORTED    Lactate, Sepsis    Collection Time: 12/04/21  2:44 PM   Result Value Ref Range    Lactic Acid, Sepsis 1.2 0.5 - 1.9 mmol/L    Lactic Acid, Sepsis, Whole Blood NOT REPORTED 0.5 - 1.9 mmol/L       Imaging/Diagnostics:  CT SOFT TISSUE NECK W CONTRAST    Result Date: 12/4/2021  Left peritonsillar abscess measuring 3.6 x 1.9 x 1.9 cm. Small bilateral upper lobe infiltrates may represent multifocal pneumonia. COVID-19 pneumonia is a possibility. CT CHEST PULMONARY EMBOLISM W CONTRAST    Result Date: 12/4/2021  Ground-glass opacity is seen throughout the lungs bilaterally consistent with atypical pneumonia. No acute or chronic pulmonary embolism. Assessment :      Hospital Problems           Last Modified POA    * (Principal) Pneumonia due to COVID-19 virus 12/5/2021 Yes    Tonsillar abscess 12/4/2021 Yes          Plan:     Patient status inpatient in the Progressive Unit/Step down    1. Supportive treatment and monitor oxygenation status  2. Continue with steroids 10 mg daily   3. On unasyn for the abscess - continue treatment   4. Gi proph  5.  DVT proph    Consultations:   IP CONSULT TO OTOLARYNGOLOGY  IP CONSULT TO HOSPITALIST  IP CONSULT TO PHARMACY  IP CONSULT TO INFECTIOUS DISEASES  IP CONSULT TO OTOLARYNGOLOGY     Patient is admitted as inpatient status because of co-morbidities listed above, severity of signs and symptoms as outlined, requirement for current medical therapies and most importantly because of direct risk to patient if care not provided in a hospital setting. Expected length of stay > 48 hours.     Susana Graham, APRN - [phone] [facility]  12/4/2021  9:54 PM    Copy sent to Dr. Simin Magallon

## 2021-12-05 NOTE — ED PROVIDER NOTES
[facility]  ED  Emergency Department Encounter  EmergencyMedicine Resident     Pt Sondra Martinez  MRN: [id number]  [place] 1980  Date of evaluation: 12/4/21  PCP:  Grisel Avila       Chief Complaint   Patient presents with    Abscess     Tx from [place] ED to see ENT for a peritonsilar abscess. No airway compromise. Covid + on o2 for hypoxia into the high 80s on room air. HISTORY OF PRESENT ILLNESS  (Location/Symptom, Timing/Onset, Context/Setting, Quality, Duration, Modifying Factors, Severity.)      Cristine Carpenter is a 35 y.o. male who presents with left-sided peritonsillar abscess and Covid 19 infection. Patient states that he felt feverish 11 days ago, he tested positive for COVID-19 5 days ago, he is Covid unvaccinated. He complains of shortness of breath on exertion, myalgias, loss of smell, decreased appetite. Today became concerned when he had a sore throat was coughing up blood. He presented to outlying facility where CT PE study was negative for PE, showed bilateral groundglass opacities and also got a CT soft tissue neck showing a left-sided peritonsillar abscess measuring      PAST MEDICAL / SURGICAL / SOCIAL / FAMILY HISTORY      has no past medical history on file. has a past surgical history that includes sinus surgery and EKG 12 Lead (1/4/2016).     Social History     Socioeconomic History    Marital status:      Spouse name: Not on file    Number of children: Not on file    Years of education: Not on file    Highest education level: Not on file   Occupational History    Not on file   Tobacco Use    Smoking status: Light Tobacco Smoker     Packs/day: 0.25     Start date: 11/18/2012    Smokeless tobacco: Never Used   Substance and Sexual Activity    Alcohol use: Yes     Comment: occasional    Drug use: No    Sexual activity: Yes     Partners: Female   Other Topics Concern    Not on file   Social History Narrative    Not on file     Social Determinants of Health     Financial Resource Strain: Low Risk     Difficulty of Paying Living Expenses: Not hard at all   Food Insecurity: No Food Insecurity    Worried About Running Out of Food in the Last Year: Never true    [address] in the Last Year: Never true   Transportation Needs:     Lack of Transportation (Medical): Not on file    Lack of Transportation (Non-Medical): Not on file   Physical Activity:     Days of Exercise per Week: Not on file    Minutes of Exercise per Session: Not on file   Stress:     Feeling of Stress : Not on file   Social Connections:     Frequency of Communication with Friends and Family: Not on file    Frequency of Social Gatherings with Friends and Family: Not on file    Attends [employer] Services: Not on file    Active Member of [address] [place] [address] [employer] or Organizations: Not on file    Attends Club or Organization Meetings: Not on file    Marital Status: Not on file   Intimate Partner Violence:     Fear of Current or Ex-Partner: Not on file    Emotionally Abused: Not on file    Physically Abused: Not on file    Sexually Abused: Not on file   Housing Stability:     Unable to Pay for Housing in the Last Year: Not on file    Number of [place] in the Last Year: Not on file    Unstable Housing in the Last Year: Not on file       Family History   Problem Relation Age of Onset    Other Mother     Heart Disease Father     High Blood Pressure Father     High Cholesterol Father     Cancer Father        Allergies:  Patient has no known allergies. Home Medications:  Prior to Admission medications    Medication Sig Start Date End Date Taking?  Authorizing Provider   ibuprofen (ADVIL;MOTRIN) 800 MG tablet Take 1 tablet by mouth every 8 hours as needed for Pain (with food) 9/7/21   JOHNNIE Serna - NP       REVIEW OF SYSTEMS    (2-9 systems for level 4, 10 or more for level 5)      Review of Systems   Constitutional: Negative for chills and fever. HENT: Positive for sore throat. Negative for congestion. Respiratory: Positive for shortness of breath. Negative for cough. Cardiovascular: Negative for chest pain. Gastrointestinal: Positive for nausea. Negative for abdominal pain and vomiting. Genitourinary: Negative for dysuria and frequency. Musculoskeletal: Positive for myalgias. Negative for neck stiffness. Skin: Negative for rash. Neurological: Negative for weakness and headaches. PHYSICAL EXAM   (up to 7 for level 4, 8 or more for level 5)      INITIAL VITALS:   /67   Pulse (!) 49   Temp 99.2 °F (37.3 °C) (Oral)   Resp 20   SpO2 98%      Vitals:    12/05/21 0015 12/05/21 0045 12/05/21 0100 12/05/21 0300   BP: 123/82 131/80 128/81 122/67   Pulse: 65 63 60 (!) 49   Resp: 21 19 17 20   Temp:       TempSrc:       SpO2: 99%  96% 98%        Physical Exam  Vitals reviewed. Constitutional:       General: He is not in acute distress. Appearance: He is well-developed. He is not ill-appearing. HENT:      Head: Normocephalic and atraumatic. Mouth/Throat:      Comments: Left tonsillar swelling. Uvula is midline. No voice changes. No trismus. No stridor  Eyes:      Extraocular Movements: Extraocular movements intact. Pupils: Pupils are equal, round, and reactive to light. Cardiovascular:      Rate and Rhythm: Normal rate and regular rhythm. Pulmonary:      Effort: Pulmonary effort is normal.      Breath sounds: Normal breath sounds. Abdominal:      General: There is no distension. Palpations: Abdomen is soft. Tenderness: There is no abdominal tenderness. Musculoskeletal:         General: Normal range of motion. Cervical back: Normal range of motion and neck supple. Skin:     General: Skin is warm and dry. Neurological:      General: No focal deficit present. Mental Status: He is alert and oriented to person, place, and time. Sensory: No sensory deficit. Motor: No weakness. DIFFERENTIAL  DIAGNOSIS     PLAN (LABS / IMAGING / EKG):  Orders Placed This Encounter   Procedures    Culture, Anaerobic and Aerobic    CBC Auto Differential    Comprehensive Metabolic Panel w/ Reflex to MG    Procalcitonin    C-Reactive Protein    Vitamin D 25 Hydroxy    ADULT DIET;  Regular    Vital signs per unit routine    Telemetry monitoring - continuous duration    Notify physician    Up as tolerated    PPE Instructions    Telemetry monitoring - continuous duration    Full Code    Inpatient consult to Otolaryngology    Inpatient consult to Hospitalist    Consult to Infectious Disease    Inpatient consult to Otolaryngology    Droplet Plus Isolation    Initiate Oxygen Therapy Protocol    Respiratory care evaluation only    PATIENT STATUS (FROM ED OR OR/PROCEDURAL) Inpatient       MEDICATIONS ORDERED:  Orders Placed This Encounter   Medications    lidocaine-EPINEPHrine 1 %-1:082262 injection 20 mL    sodium chloride flush 0.9 % injection 5-40 mL    sodium chloride flush 0.9 % injection 5-40 mL    0.9 % sodium chloride infusion    enoxaparin (LOVENOX) injection 30 mg    OR Linked Order Group     ondansetron (ZOFRAN-ODT) disintegrating tablet 4 mg     ondansetron (ZOFRAN) injection 4 mg    polyethylene glycol (GLYCOLAX) packet 17 g    OR Linked Order Group     acetaminophen (TYLENOL) tablet 650 mg     acetaminophen (TYLENOL) suppository 650 mg    dextromethorphan-guaiFENesin (ROBITUSSIN-DM)  MG/5ML liquid 5 mL    DISCONTD: dexamethasone (DECADRON) tablet 6 mg    FOLLOWED BY Linked Order Group     Vitamin D (CHOLECALCIFEROL) tablet 6,000 Units     Vitamin D (CHOLECALCIFEROL) tablet 2,000 Units    DISCONTD: ampicillin-sulbactam (UNASYN) 1500 mg IVPB minibag     Order Specific Question:   Antimicrobial Indications     Answer:   Head and Neck Infection    ampicillin-sulbactam (UNASYN) 3000 mg ivpb minibag     Order Specific Question:

## 2021-12-05 NOTE — ED NOTES
29-year-old male Aletta Legato, peritonsillar abscess, Dr. Alfredo Bhakta from ENT is aware of this patient and will come in to drain the abscess, patient also has Covid pneumonia, started on Unasyn and Decadron.     Accepted by Dr. Ezekiel Alexander, RN  12/04/21 1955

## 2021-12-05 NOTE — CONSULTS
CONSULTING SERVICE: Otolaryngology-Head and Neck Surgery    REASON FOR CONSULT:  Kaden Lopez is a 35 y.o. male seen today for   Chief Complaint   Patient presents with    Abscess     Tx from [place] ED to see ENT for a peritonsilar abscess. No airway compromise. Covid + on o2 for hypoxia into the high 80s on room air. HISTORY OF PRESENT ILLNESS:   35year old male transferred from [facility] [place] [facility] for evaluation and treatment of a left peritonsillar abscess. He developed fevers and body aches a little over a week ago and tested positive for COVID five days ago. He has had cough, and worsening shortness of breath today as well as new onset of localized left sore throat. He reports that he has had trouble swallowing, and has not eaten all day. He denies drooling. He is currently on 4 liters nasal cannula to help with oxygenation. REVIEW OF SYSTEMS:   General ROS: positive for - fever and malaise  Ophthalmic ROS: negative for - decreased vision  ENT ROS: as noted in HPE  Allergy and Immunology ROS: negative  Hematological and Lymphatic ROS: negative for - bleeding problems  Endocrine ROS: negative for - skin changes  Respiratory ROS: positive for - cough and shortness of breath  Cardiovascular ROS: no chest pain or dyspnea on exertion  Gastrointestinal ROS: no abdominal pain, change in bowel habits, or black or bloody stools  \"no abdominal pain, diarrhea, tarry stools, change in bowel habit  Musculoskeletal ROS: positive for - muscle aches    PAST HISTORY:   No past medical history on file.   Past Surgical History:   Procedure Laterality Date    EKG 12-LEAD  1/4/2016         SINUS SURGERY       Family History   Problem Relation Age of Onset    Other Mother     Heart Disease Father     High Blood Pressure Father     High Cholesterol Father     Cancer Father          Social Connections:     Frequency of Communication with Friends and Family: Not on file    Frequency of Social Gatherings with Friends and Family: Not on file    Attends Christian Services: Not on file    Active Member of Clubs or Organizations: Not on file    Attends Club or Organization Meetings: Not on file    Marital Status: Not on file        MEDICATIONS:   Current Facility-Administered Medications   Medication Dose Route Frequency Provider Last Rate Last Admin    lidocaine-EPINEPHrine 1 %[id number] injection 20 mL  20 mL IntraDERmal Once Marco Antonio Player, DO         Current Outpatient Medications   Medication Sig Dispense Refill    ibuprofen (ADVIL;MOTRIN) 800 MG tablet Take 1 tablet by mouth every 8 hours as needed for Pain (with food) 90 tablet 0        ALLERGIES:   No Known Allergies     PHYSICAL EXAM:  Vitals:    12/04/21 2000   BP: (!) 142/89   Pulse: 67   Resp: 22   Temp: 99.2 °F (37.3 °C)   TempSrc: Oral   SpO2: 97%      GENERAL: well developed and well nourished and in no acute distress  HEAD: normocephalic and atraumatic  EYES: no eyelid swelling, no conjunctival injection or exudate, pupils equal round and reactive to light  EXTERNAL EARS: normal   NOSE: nares patent, normal mucosa and no purulence or bleeding  MOUTH/THROAT: mild trismus, prominence of the left tonsillar pillar with mild soft palate fullness on the left and mild uvular deviation to the right  NECK: anterior cervical lymphadenopathy  RESPIRATORY: normal expansion, dyspnea present. Saturation 97% on 4 liters nasal cannula  NEUROLOGICAL:  cranial nerves II-XII are grossly intact    RELEVANT LABS/STUDIES:  CT Neck:  2 x 2 x 3 cm pocket of hypodensity with rim enhancement consistent with left peritonsillar abscess at the mid/ inferior tonsil    PROCEDURE: Incision and Drainage of Left Peritonsillar Abscess    SURGEON: Nemo Rawls MD  ASSISTANT:  None    PREOPERATIVE DIAGNOSIS:  Left Peritonsillar Abscess  POSTOPERATIVE DIAGNOSIS:  Left Peritonsillar Abscess    TEACHING: Procedure, benefits, and risks were explained to the patient.   Verbal informed consent was

## 2021-12-05 NOTE — PROGRESS NOTES
[facility] -   Infectious diseases evaluation  admission date 12/4/2021    reason for consultation:   covid and tonsillitis    Impression :   Current:  · covid diffuse pneumonia   · Left tonsillar edema -abscess 3.6 - 1.9 - 1.9 cm  · Drained 12/4 per ENT    Other:  ·   Discussion / summary of stay / plan of care   ·   Recommendations   · Ok for unasyn  · Decadron 10 days - 10 mg  · ENT - did drain the abscess on the bedside, lot of pus, cx pend  · START barcitinib - 12/5  · Much tonsillar abscess  · Will follow CRP ferritin    Infection Control Recommendations   · [place] Precautions  · Contact Isolation   · Airborne isolation  · Droplet Isolation      Antimicrobial Stewardship Recommendations   · Simplification of therapy  · Targeted therapy  Coordination ofOutpatient Care:   · Estimated Length of IV antimicrobials:  · Patient will need Midline / picc Catheter Insertion:   · Patient will need SNF:  · Patient will need outpatient wound care:     History of Present Illness:   Initial history:  Rodo Alford is a 35y.o.-year-old male w a week cough and aches,  Pains sore throat, coughed blood today and came in - ill x 11 days - CT chest covid diffuse pneumonia   covid + 5 days PTA  Non vaccinated  Exam left tonsillar edema w uvula shift.  No exudates seen - drained by ENt in ER lots of pus  Sent for cx        Interval changes  12/5/2021   Patient Vitals for the past 8 hrs:   BP Temp Temp src Pulse Resp   12/05/21 1545 116/73 99.6 °F (37.6 °C) Oral (!) 48 19   12/05/21 1128 131/77 98.1 °F (36.7 °C) Axillary -- --   12/5,  Sore throat improved, left tonsillar edema remains  CRP 92  Oxygen requirement increased 4 L of nasal cannula  [id number]   Ferritin1,050 High      We will start baricitinib and follow on the tonsillitis and on CRP  Discussed with patient    Summary of relevant labs:  Labs:  W 5.4  CRP92  [id number]   Ferritin1,050 High  Micro:  covid +  Imaging:  CT chest diffuse covid    I have personally reviewed the past medical history, past surgical history, medications, social history, and family history, and I haveupdated the database accordingly. Allergies:   Patient has no known allergies. Review of Systems:     Review of Systems   Constitutional: Positive for activity change. HENT: Positive for sore throat. Negative for congestion. Eyes: Negative for itching. Respiratory: Positive for cough and shortness of breath. Cardiovascular: Negative for chest pain. Gastrointestinal: Negative for abdominal distention. Endocrine: Negative for heat intolerance. Genitourinary: Negative for dysuria. Musculoskeletal: Negative for arthralgias. Skin: Negative for color change. Allergic/Immunologic: Negative for immunocompromised state. Neurological: Negative for dizziness, light-headedness and headaches. Hematological: Negative for adenopathy. Psychiatric/Behavioral: Negative for agitation. Physical Examination :       Physical Exam  Constitutional:       Appearance: Normal appearance. He is normal weight. HENT:      Head: Normocephalic and atraumatic. Nose: Nose normal.      Mouth/Throat:      Mouth: Mucous membranes are moist.      Pharynx: Posterior oropharyngeal erythema present. Eyes:      General: No scleral icterus. Conjunctiva/sclera: Conjunctivae normal.   Cardiovascular:      Rate and Rhythm: Normal rate and regular rhythm. Heart sounds: Normal heart sounds. No murmur heard. Pulmonary:      Breath sounds: No stridor. No rhonchi. Abdominal:      General: There is no distension. Palpations: Abdomen is soft. Tenderness: There is no abdominal tenderness. Genitourinary:     Comments: No vail  Musculoskeletal:         General: No swelling, tenderness, deformity or signs of injury. Cervical back: Neck supple. No rigidity. Skin:     Coloration: Skin is not jaundiced.       Findings: No bruising. Neurological:      General: No focal deficit present. Mental Status: He is alert and oriented to person, place, and time. Psychiatric:         Mood and Affect: Mood normal.         Thought Content: Thought content normal.         Past Medical History:   No past medical history on file. Past Surgical  History:     Past Surgical History:   Procedure Laterality Date    EKG 12-LEAD  1/4/2016         SINUS SURGERY         Medications:      sodium chloride flush  5-40 mL IntraVENous 2 times per day    enoxaparin  30 mg SubCUTAneous BID    Vitamin D  6,000 Units Oral Daily    Followed by   Pablito Vizcaino ON 12/12/2021] Vitamin D  2,000 Units Oral Daily    ampicillin-sulbactam  3,000 mg IntraVENous Q6H    dexamethasone  10 mg Oral Daily       Social History:     Social History     Socioeconomic History    Marital status:      Spouse name: Not on file    Number of children: Not on file    Years of education: Not on file    Highest education level: Not on file   Occupational History    Not on file   Tobacco Use    Smoking status: Light Tobacco Smoker     Packs/day: 0.25     Start date: 11/18/2012    Smokeless tobacco: Never Used   Substance and Sexual Activity    Alcohol use: Yes     Comment: occasional    Drug use: No    Sexual activity: Yes     Partners: Female   Other Topics Concern    Not on file   Social History Narrative    Not on file     Social Determinants of Health     Financial Resource Strain: Low Risk     Difficulty of Paying Living Expenses: Not hard at all   Food Insecurity: No Food Insecurity    Worried About Running Out of Food in the Last Year: Never true    Lakisha of Food in the Last Year: Never true   Transportation Needs:     Lack of Transportation (Medical): Not on file    Lack of Transportation (Non-Medical):  Not on file   Physical Activity:     Days of Exercise per Week: Not on file    Minutes of Exercise per Session: Not on file   Stress:     Feeling of Stress : Not on file   Social Connections:     Frequency of Communication with Friends and Family: Not on file    Frequency of Social Gatherings with Friends and Family: Not on file    Attends Evangelical Services: Not on file    Active Member of Clubs or Organizations: Not on file    Attends Club or Organization Meetings: Not on file    Marital Status: Not on file   Intimate Partner Violence:     Fear of Current or Ex-Partner: Not on file    Emotionally Abused: Not on file    Physically Abused: Not on file    Sexually Abused: Not on file   Housing Stability:     Unable to Pay for Housing in the Last Year: Not on file    Number of [place] in the Last Year: Not on file    Unstable Housing in the Last Year: Not on file       Family History:     Family History   Problem Relation Age of Onset    Other Mother     Heart Disease Father     High Blood Pressure Father     High Cholesterol Father     Cancer Father       Medical Decision Making:   I have independently reviewed/ordered the following labs:    CBC with Differential:   Recent Labs     12/04/21  1242 12/05/21  1120   WBC 5.4 7.9   HGB 15.0 15.4   HCT 45.0 45.6    204   LYMPHOPCT 13* 13*   MONOPCT 7 7     BMP:  Recent Labs     12/04/21  1249 12/05/21  1120    139   K 3.9 4.8    101   CO2 28 28   BUN 13 16   CREATININE 0.93 0.82     Hepatic Function Panel:   Recent Labs     12/04/21  1249 12/05/21  1120   PROT 6.5 6.6   LABALBU 3.7 3.7   BILIDIR 0.16  --    IBILI 0.24  --    BILITOT 0.40 0.48   ALKPHOS 70 67   ALT 20 15   AST 26 23     No results for input(s): RPR in the last 72 hours. No results for input(s): HIV in the last 72 hours. No results for input(s): BC in the last 72 hours. Lab Results   Component Value Date    CREATININE 0.82 12/05/2021    GLUCOSE 116 12/05/2021       Detailed results: Thank you for allowing us to participate in the care of this patient. Please call with questions.     This note is created with the assistance of a speech recognition program.  While intending to generate adocument that actually reflects the content of the visit, the document can still have some errors including those of syntax and sound a like substitutions which may escape proof reading. It such instances, actual meaningcan be extrapolated by contextual diversion.     Kaela Huizar MD  Office: [phone]  Perfect serve / office [phone]

## 2021-12-05 NOTE — PROGRESS NOTES
[facility]  Office: [address], DO, Galina Cardonaroe, DO, Noelemmett Dry, DO, Brian Lenka Blood, DO, Stacy Mcgrath MD, Lakshmi Malave MD, Jessica Medina MD, Tish Gore MD, Lisa Salter MD, Malik Barrett MD, Rah Méndez MD, Jim Gipson, DO, Ruthann Mcdonald, DO, Silvano Demarco MD,  Sadia Dugan, DO, Kacie Montano MD, Sabiha Borjas MD, Melany Bolivar MD, Julia Houser MD, Boston Alford MD, All Darby MD, Grant Tai MD, Miguel Duncan, [facility], [facility], CNP, [place], CNP, Dejah King, CNS, Rolando Barba, CNP, Tim Busby, CNP, Varinder Garay, CNP, Shelly Quinonez, CNP, Osvaldo Baldwin, CNP, Eloy Flowers PA-C, Henry Fermin, [facility], Luis Armendariz, CNP, Phyljulia Magallanes, CNP, Nakia Valencia, CNP, Kacy Cervantes, CNP, Desiree Lipoma, CNP, Marta Daugherty, CNP, Jennifer Green, [address] [place] [address]    Progress Note    12/5/2021    1:11 PM    Name:   Lion Roberts  MRN:     [id number]     [place]:      [de-identified]   Room:   [address] [place] [address] Day:  1  Admit Date:  12/4/2021  7:54 PM    PCP:   Ermias Lin  Code Status:  Full Code    Subjective:     C/C:   Chief Complaint   Patient presents with    Abscess     Tx from [place] ED to see ENT for a peritonsilar abscess. No airway compromise. Covid + on o2 for hypoxia into the high 80s on room air. Interval History Status: not changed. No issues overnight  Respiratory status stable  Currently on 4 L  Did not receive vaccine  Underwent I and D per ENT    Brief History:     Lion Roberts is a 35 y.o. M who presented to [place] ED with sore throat and hemoptysis. Symptoms going on for the past day. Found to have left peritonsillar swelling with uvula deviation. CT scan showed left peritonsillar abscess measuring 3.6 x 1.9 x 1.9 cm and small bilateral upper lobe infiltrates/ ground glass infiltrates t/o bilateral lungs, No PE. COVID positive.  Did not get vaccine. Transferred to [facility].     Review of Systems:     Constitutional:  negative for chills, fevers, sweats  Respiratory:  negative for cough, dyspnea on exertion  Cardiovascular:  negative for chest pain, chest pressure/discomfort, lower extremity edema, palpitations  Gastrointestinal:  negative for abdominal pain, constipation, diarrhea, nausea, vomiting  Neurological:  negative for dizziness, headache    Medications: Allergies:  No Known Allergies    Current Meds:   Scheduled Meds:    sodium chloride flush  5-40 mL IntraVENous 2 times per day    enoxaparin  30 mg SubCUTAneous BID    Vitamin D  6,000 Units Oral Daily    Followed by   Reid Suárez ON 2021] Vitamin D  2,000 Units Oral Daily    ampicillin-sulbactam  3,000 mg IntraVENous Q6H    dexamethasone  10 mg Oral Daily     Continuous Infusions:    sodium chloride       PRN Meds: sodium chloride flush, sodium chloride, ondansetron **OR** ondansetron, polyethylene glycol, acetaminophen **OR** acetaminophen, dextromethorphan-guaiFENesin    Data:     Past Medical History:   has no past medical history on file. Social History:   reports that he has been smoking. He started smoking about 9 years ago. He has been smoking about 0.25 packs per day. He has never used smokeless tobacco. He reports current alcohol use. He reports that he does not use drugs. Family History:   Family History   Problem Relation Age of Onset    Other Mother     Heart Disease Father     High Blood Pressure Father     High Cholesterol Father     Cancer Father        Vitals:  /77   Pulse 66   Temp 98.1 °F (36.7 °C) (Axillary)   Resp 16   Ht 6' (1.829 m)   Wt 201 lb 1.6 oz (91.2 kg)   SpO2 92%   BMI 27.27 kg/m²   Temp (24hrs), Av.6 °F (37 °C), Min:98.1 °F (36.7 °C), Max:99.2 °F (37.3 °C)    No results for input(s): POCGLU in the last 72 hours. I/O (24Hr):     Intake/Output Summary (Last 24 hours) at 2021 1311  Last data filed at 2021 0015  Gross per 24 hour   Intake 100 ml   Output --   Net 100 ml       Labs:  Hematology:  Recent Labs     12/04/21  1242 12/04/21  1249 12/05/21  1120   WBC 5.4  --  7.9   RBC 4.88  --  5.00   HGB 15.0  --  15.4   HCT 45.0  --  45.6   MCV 92.2  --  91.2   MCH 30.7  --  30.8   MCHC 33.3  --  33.8   RDW 11.9  --  11.9     --  204   MPV 9.7  --  9.9   DDIMER  --  1.24*  --      Chemistry:  Recent Labs     12/04/21  1249 12/04/21  1444 12/05/21  1120     --  139   K 3.9  --  4.8     --  101   CO2 28  --  28   GLUCOSE 125*  --  116*   BUN 13  --  16   CREATININE 0.93  --  0.82   ANIONGAP 9  --  10   LABGLOM >60  --  >60   GFRAA >60  --  >60   CALCIUM 8.7  --  9.2   PROBNP 86  --   --    TROPHS <6 6  --      Recent Labs     12/04/21  1249 12/05/21  1120   PROT 6.5 6.6   LABALBU 3.7 3.7   AST 26 23   ALT 20 15   ALKPHOS 70 67   BILITOT 0.40 0.48   BILIDIR 0.16  --      ABG:No results found for: POCPH, PHART, PH, POCPCO2, YZL1GFU, PCO2, POCPO2, PO2ART, PO2, POCHCO3, EJW6LOY, HCO3, NBEA, PBEA, BEART, BE, THGBART, THB, RTH8MYJ, KAPV3STA, B3PKBSHC, O2SAT, FIO2  Lab Results   Component Value Date/Time    SPECIAL NOT REPORTED 12/04/2021 10:34 PM     Lab Results   Component Value Date/Time    CULTURE ORAL BEN PRESENT 12/04/2021 10:34 PM       Radiology:  CT SOFT TISSUE NECK W CONTRAST    Result Date: 12/4/2021  Left peritonsillar abscess measuring 3.6 x 1.9 x 1.9 cm. Small bilateral upper lobe infiltrates may represent multifocal pneumonia. COVID-19 pneumonia is a possibility. CT CHEST PULMONARY EMBOLISM W CONTRAST    Result Date: 12/4/2021  Ground-glass opacity is seen throughout the lungs bilaterally consistent with atypical pneumonia. No acute or chronic pulmonary embolism.        Physical Examination:        General appearance:  alert, cooperative and no distress, on NC  Mental Status:  oriented to person, place and time and normal affect  Lungs:  clear to auscultation bilaterally, normal effort  Heart:  regular rate and rhythm  Abdomen:  soft, nontender, nondistended, normal bowel sounds  Extremities:  no edema, redness, tenderness in the calves  Skin:  no gross lesions, rashes, induration    Assessment:        Hospital Problems           Last Modified POA    * (Principal) Pneumonia due to COVID-19 virus 12/5/2021 Yes    Tonsillar abscess 12/4/2021 Yes          Plan:        - Vitals, labs, imaging, medications reviewed  - ENT consulted - s/p I and D (12/4) - continue unasyn  - COVID positive - droplet plus isolation  - ID consult  - Wean O2 as tolerated   - Decadron   - Monitor O2 requirements     Chandler Jovel MD  12/5/2021  1:11 PM

## 2021-12-05 NOTE — ED NOTES
PT resting comfortably in bed watching television. Denies CP/SOB. Airway intact/ VSS. PT verbalizes no needs at this time.       Jeaneth Bautista RN  12/04/21 0583

## 2021-12-05 NOTE — ED PROVIDER NOTES
[facility]     Emergency Department     Faculty Attestation    I performed a history and physical examination of the patient and discussed management with the resident. I reviewed the resident´s note and agree with the documented findings and plan of care. Any areas of disagreement are noted on the chart. I was personally present for the key portions of any procedures. I have documented in the chart those procedures where I was not present during the key portions. I have reviewed the emergency nurses triage note. I agree with the chief complaint, past medical history, past surgical history, allergies, medications, social and family history as documented unless otherwise noted below. For Physician Assistant/ Nurse Practitioner cases/documentation I have personally evaluated this patient and have completed at least one if not all key elements of the E/M (history, physical exam, and MDM). Additional findings are as noted. Transfer [place], left peritonsillar abscess, voice normal, handling secretions well, also diagnosed with Covid pneumonia, bibasilar rales, heart regular rate and rhythm, no lower extremity pain or swelling on examination. Dr. Parth Joy from ENT is aware of this patient.      Dima Fletcher MD  12/04/21 2031

## 2021-12-05 NOTE — ED NOTES
Bed: 49PED  Expected date:   Expected time:   Means of arrival:   Comments:  SANTINO 711 Wojciech Jorge RN  12/04/21 1954

## 2021-12-05 NOTE — PLAN OF CARE
Problem: Airway Clearance - Ineffective  Goal: Achieve or maintain patent airway  12/5/2021 1807 by Radha Pemberton RN  Outcome: Ongoing  12/5/2021 1806 by Radha Pemberton RN  Outcome: Ongoing     Problem: Gas Exchange - Impaired  Goal: Absence of hypoxia  12/5/2021 1807 by Radha Pemberton RN  Outcome: Ongoing  12/5/2021 1806 by Radha Pemberton RN  Outcome: Ongoing  Goal: Promote optimal lung function  12/5/2021 1807 by Radha Pemberton RN  Outcome: Ongoing  12/5/2021 1806 by Radha Pemberton RN  Outcome: Ongoing     Problem: Breathing Pattern - Ineffective  Goal: Ability to achieve and maintain a regular respiratory rate  12/5/2021 1807 by Radha Pemberton RN  Outcome: Ongoing  12/5/2021 1806 by Radha Pemberton RN  Outcome: Ongoing     Problem:  Body Temperature -  Risk of, Imbalanced  Goal: Ability to maintain a body temperature within defined limits  12/5/2021 1807 by Radha Pemberton RN  Outcome: Ongoing  12/5/2021 1806 by Radha Pemberton RN  Outcome: Ongoing  Goal: Will regain or maintain usual level of consciousness  12/5/2021 1807 by Radha Pemberton RN  Outcome: Ongoing  12/5/2021 1806 by Radha Pemberton RN  Outcome: Ongoing  Goal: Complications related to the disease process, condition or treatment will be avoided or minimized  12/5/2021 1807 by Radha Pemberton RN  Outcome: Ongoing  12/5/2021 1806 by Radha Pemberton RN  Outcome: Ongoing     Problem: Isolation Precautions - Risk of Spread of Infection  Goal: Prevent transmission of infection  12/5/2021 1807 by Radha Pemberton RN  Outcome: Ongoing  12/5/2021 1806 by Radha Pemberton RN  Outcome: Ongoing     Problem: Nutrition Deficits  Goal: Optimize nutritional status  12/5/2021 1807 by Radha Pemberton RN  Outcome: Ongoing  12/5/2021 1806 by Radha Pemberton RN  Outcome: Ongoing     Problem: Risk for Fluid Volume Deficit  Goal: Maintain normal heart rhythm  12/5/2021 1807 by Radha Pemberton RN  Outcome: Ongoing  12/5/2021 1806 by Matthew Mcgrath RN  Outcome: Ongoing  Goal: Maintain absence of muscle cramping  12/5/2021 1807 by Matthew Mcgrath RN  Outcome: Ongoing  12/5/2021 1806 by Matthew Mcgrath RN  Outcome: Ongoing  Goal: Maintain normal serum potassium, sodium, calcium, phosphorus, and pH  12/5/2021 1807 by Matthew Mcgrath RN  Outcome: Ongoing  12/5/2021 1806 by Matthew Mcgrath RN  Outcome: Ongoing     Problem: Loneliness or Risk for Loneliness  Goal: Demonstrate positive use of time alone when socialization is not possible  12/5/2021 1807 by Matthew Mcgrath RN  Outcome: Ongoing  12/5/2021 1806 by Matthew Mcgrath RN  Outcome: Ongoing     Problem: Fatigue  Goal: Verbalize increase energy and improved vitality  12/5/2021 1807 by Matthew Mcgrath RN  Outcome: Ongoing  12/5/2021 1806 by Matthew Mcgrath RN  Outcome: Ongoing     Problem: Patient Education: Go to Patient Education Activity  Goal: Patient/Family Education  12/5/2021 1807 by Matthew Mcgrath RN  Outcome: Ongoing  12/5/2021 1806 by Matthew Mcgrath RN  Outcome: Ongoing     Problem: Pain:  Goal: Pain level will decrease  Description: Pain level will decrease  12/5/2021 1807 by Matthew Mcgrath RN  Outcome: Ongoing  12/5/2021 1806 by Matthew Mcgrath RN  Outcome: Ongoing  Goal: Control of acute pain  Description: Control of acute pain  12/5/2021 1807 by Matthew Mcgrath RN  Outcome: Ongoing  12/5/2021 1806 by Matthew Mcgrath RN  Outcome: Ongoing  Goal: Control of chronic pain  Description: Control of chronic pain  12/5/2021 1807 by Matthew Mcgrath RN  Outcome: Ongoing  12/5/2021 1806 by Matthew Mcgrath RN  Outcome: Ongoing

## 2021-12-06 LAB
C-REACTIVE PROTEIN: 47.6 MG/L (ref 0–5)
PROCALCITONIN: 0.03 NG/ML

## 2021-12-06 PROCEDURE — 6360000002 HC RX W HCPCS: Performed by: NURSE PRACTITIONER

## 2021-12-06 PROCEDURE — 6360000002 HC RX W HCPCS: Performed by: INTERNAL MEDICINE

## 2021-12-06 PROCEDURE — 99232 SBSQ HOSP IP/OBS MODERATE 35: CPT | Performed by: INTERNAL MEDICINE

## 2021-12-06 PROCEDURE — 86140 C-REACTIVE PROTEIN: CPT

## 2021-12-06 PROCEDURE — 6370000000 HC RX 637 (ALT 250 FOR IP): Performed by: NURSE PRACTITIONER

## 2021-12-06 PROCEDURE — 84145 PROCALCITONIN (PCT): CPT

## 2021-12-06 PROCEDURE — 36415 COLL VENOUS BLD VENIPUNCTURE: CPT

## 2021-12-06 PROCEDURE — 2580000003 HC RX 258: Performed by: NURSE PRACTITIONER

## 2021-12-06 PROCEDURE — 2060000000 HC ICU INTERMEDIATE R&B

## 2021-12-06 PROCEDURE — 6370000000 HC RX 637 (ALT 250 FOR IP): Performed by: INTERNAL MEDICINE

## 2021-12-06 PROCEDURE — 2580000003 HC RX 258: Performed by: INTERNAL MEDICINE

## 2021-12-06 RX ADMIN — AMPICILLIN SODIUM AND SULBACTAM SODIUM 3000 MG: 2; 1 INJECTION, POWDER, FOR SOLUTION INTRAMUSCULAR; INTRAVENOUS at 14:25

## 2021-12-06 RX ADMIN — ENOXAPARIN SODIUM 30 MG: 100 INJECTION SUBCUTANEOUS at 20:44

## 2021-12-06 RX ADMIN — Medication 6000 UNITS: at 08:59

## 2021-12-06 RX ADMIN — AMPICILLIN SODIUM AND SULBACTAM SODIUM 3000 MG: 2; 1 INJECTION, POWDER, FOR SOLUTION INTRAMUSCULAR; INTRAVENOUS at 02:34

## 2021-12-06 RX ADMIN — SODIUM CHLORIDE, PRESERVATIVE FREE 10 ML: 5 INJECTION INTRAVENOUS at 09:00

## 2021-12-06 RX ADMIN — ENOXAPARIN SODIUM 30 MG: 100 INJECTION SUBCUTANEOUS at 09:00

## 2021-12-06 RX ADMIN — AMPICILLIN SODIUM AND SULBACTAM SODIUM 3000 MG: 2; 1 INJECTION, POWDER, FOR SOLUTION INTRAMUSCULAR; INTRAVENOUS at 20:44

## 2021-12-06 RX ADMIN — SODIUM CHLORIDE, PRESERVATIVE FREE 10 ML: 5 INJECTION INTRAVENOUS at 20:44

## 2021-12-06 RX ADMIN — DEXAMETHASONE 10 MG: 4 TABLET ORAL at 08:59

## 2021-12-06 RX ADMIN — BARICITINIB 4 MG: 2 TABLET, FILM COATED ORAL at 11:43

## 2021-12-06 RX ADMIN — AMPICILLIN SODIUM AND SULBACTAM SODIUM 3000 MG: 2; 1 INJECTION, POWDER, FOR SOLUTION INTRAMUSCULAR; INTRAVENOUS at 09:00

## 2021-12-06 ASSESSMENT — PAIN SCALES - GENERAL
PAINLEVEL_OUTOF10: 0

## 2021-12-06 NOTE — PROGRESS NOTES
[facility]  Office: [address], DO, Angelic Redmond, DO, Kapil Silva, DO, Dian Gillespie [facility], DO, Rama Kilgore MD, Cesar Perea MD, Christy Mendenhall MD, Natalio Banerjee MD, Selene Hilton MD, Analia Herrera MD, Jania Escobar MD, Antonio Zaragoza, DO, Eleazar Quan, DO, Katie Reich MD,  Yolette Garcia, DO, Arpan Charles MD, Gavin Falcon MD, Aura Rodriguez MD, Jason Zaldivar MD, Kaykay Chu MD, Reta Gonzalez MD, Reji Amos MD, Gayatri Guillen, [facility], [facility], CNP, Marlen Navas, CNP, Brendon Paz, CNS, Keli Darby, CNP, Juliane Duran, CNP, Santa White, CNP, Georgina Jensen, CNP, Mir Tolentino, CNP, Lalit Pisano PA-C, Monique Reyes, [facility], Will Cheng, CNP, Esequiel Mccormack, CNP, Lester Hernandez, CNP, Candi Espinal, CNP, Jeanette Santillan, CNP, Naila Pineda, CNP, [facility], [address] [place] [address]    Progress Note    12/6/2021    12:19 PM    Name:   Shoshana Lee  MRN:     [id number]     [place]:      [de-identified]   Room:   [address] [place] [address] Day:  2  Admit Date:  12/4/2021  7:54 PM    PCP:   Maria D Nunez  Code Status:  Full Code    Subjective:     C/C:   Chief Complaint   Patient presents with    Abscess     Tx from [place] ED to see ENT for a peritonsilar abscess. No airway compromise. Covid + on o2 for hypoxia into the high 80s on room air. Interval History Status: not changed. No issues overnight  Respiratory status stable  Currently on 4 L  Did not receive vaccine  Underwent I and D per ENT  Remain on IV abx  Still high O2 requirement, d/c planning once improved     Brief History:     Shoshana Lee is a 35 y.o. M who presented to [facility] ED with sore throat and hemoptysis. Symptoms going on for the past day. Found to have left peritonsillar swelling with uvula deviation.  CT scan showed left peritonsillar abscess measuring 3.6 x 1.9 x 1.9 cm and small bilateral upper lobe infiltrates/ ground glass infiltrates t/o bilateral lungs, No PE. COVID positive. Did not get vaccine. Transferred to [facility].     Underwent I and D per ENT  Hypoxic requiring O2  Started on COVID treatment per ID    Review of Systems:     Constitutional:  negative for chills, fevers, sweats  Respiratory:  negative for cough  Cardiovascular:  negative for chest pain, chest pressure/discomfort, lower extremity edema, palpitations  Gastrointestinal:  negative for abdominal pain, constipation, diarrhea, nausea, vomiting  Neurological:  negative for dizziness, headache    Medications: Allergies:  No Known Allergies    Current Meds:   Scheduled Meds:    baricitinib  4 mg Oral Daily    sodium chloride flush  5-40 mL IntraVENous 2 times per day    enoxaparin  30 mg SubCUTAneous BID    Vitamin D  6,000 Units Oral Daily    Followed by   Leo Glaser ON 2021] Vitamin D  2,000 Units Oral Daily    ampicillin-sulbactam  3,000 mg IntraVENous Q6H    dexamethasone  10 mg Oral Daily     Continuous Infusions:    sodium chloride       PRN Meds: sodium chloride flush, sodium chloride, ondansetron **OR** ondansetron, polyethylene glycol, acetaminophen **OR** acetaminophen, dextromethorphan-guaiFENesin    Data:     Past Medical History:   has no past medical history on file. Social History:   reports that he has been smoking. He started smoking about 9 years ago. He has been smoking about 0.25 packs per day. He has never used smokeless tobacco. He reports current alcohol use. He reports that he does not use drugs.      Family History:   Family History   Problem Relation Age of Onset    Other Mother     Heart Disease Father     High Blood Pressure Father     High Cholesterol Father     Cancer Father        Vitals:  /83   Pulse 63   Temp 98.6 °F (37 °C) (Oral)   Resp 17   Ht 6' (1.829 m)   Wt 201 lb 1.6 oz (91.2 kg)   SpO2 94%   BMI 27.27 kg/m²   Temp (24hrs), Av.7 °F (37.1 °C), Min:98.4 °F (36.9 °C), Max:99.6 °F (37.6 °C)    No results for input(s): POCGLU in the last 72 hours. I/O (24Hr): Intake/Output Summary (Last 24 hours) at 12/6/2021 1219  Last data filed at 12/6/2021 0610  Gross per 24 hour   Intake 440 ml   Output 400 ml   Net 40 ml       Labs:  Hematology:  Recent Labs     12/04/21  1242 12/04/21  1249 12/05/21  1120 12/06/21  0603   WBC 5.4  --  7.9  --    RBC 4.88  --  5.00  --    HGB 15.0  --  15.4  --    HCT 45.0  --  45.6  --    MCV 92.2  --  91.2  --    MCH 30.7  --  30.8  --    MCHC 33.3  --  33.8  --    RDW 11.9  --  11.9  --      --  204  --    MPV 9.7  --  9.9  --    CRP  --   --  92.4* 47.6*   DDIMER  --  1.24*  --   --      Chemistry:  Recent Labs     12/04/21  1249 12/04/21  1444 12/05/21  1120     --  139   K 3.9  --  4.8     --  101   CO2 28  --  28   GLUCOSE 125*  --  116*   BUN 13  --  16   CREATININE 0.93  --  0.82   ANIONGAP 9  --  10   LABGLOM >60  --  >60   GFRAA >60  --  >60   CALCIUM 8.7  --  9.2   PROBNP 86  --   --    TROPHS <6 6  --      Recent Labs     12/04/21  1249 12/05/21  1120   PROT 6.5 6.6   LABALBU 3.7 3.7   AST 26 23   ALT 20 15   ALKPHOS 70 67   BILITOT 0.40 0.48   BILIDIR 0.16  --      ABG:No results found for: POCPH, PHART, PH, POCPCO2, VUY9UQG, PCO2, POCPO2, PO2ART, PO2, POCHCO3, HNS1SEK, HCO3, NBEA, PBEA, BEART, BE, THGBART, THB, QTU3SKJ, CBIX4GER, F2PILOYP, O2SAT, FIO2  Lab Results   Component Value Date/Time    SPECIAL NOT REPORTED 12/04/2021 10:34 PM     Lab Results   Component Value Date/Time    CULTURE ORAL BEN PRESENT 12/04/2021 10:34 PM       Radiology:  CT SOFT TISSUE NECK W CONTRAST    Result Date: 12/4/2021  Left peritonsillar abscess measuring 3.6 x 1.9 x 1.9 cm. Small bilateral upper lobe infiltrates may represent multifocal pneumonia. COVID-19 pneumonia is a possibility. CT CHEST PULMONARY EMBOLISM W CONTRAST    Result Date: 12/4/2021  Ground-glass opacity is seen throughout the lungs bilaterally consistent with atypical pneumonia.  No acute or chronic pulmonary embolism.        Physical Examination:        General appearance:  alert, cooperative and no distress, on NC  Mental Status:  oriented to person, place and time and normal affect  Lungs:  clear to auscultation bilaterally, normal effort  Heart:  regular rate and rhythm  Abdomen:  soft, nontender, nondistended, normal bowel sounds  Extremities:  no edema, redness, tenderness in the calves  Skin:  no gross lesions, rashes, induration    Assessment:        Hospital Problems           Last Modified POA    * (Principal) Pneumonia due to COVID-19 virus 12/5/2021 Yes    Tonsillar abscess 12/4/2021 Yes          Plan:        - Vitals, labs, imaging, medications reviewed  - ENT consulted - s/p I and D (12/4) - continue unasyn  - COVID positive - droplet plus isolation  - ID consult - decadron, barcitinib   - Wean O2 as tolerated   - Decadron   - Monitor O2 requirements - currently requiring 4 L  - DC planning when O2 requirements decreasing     Carissa Jeff MD  12/6/2021  12:19 PM

## 2021-12-06 NOTE — PLAN OF CARE
Problem: Airway Clearance - Ineffective  Goal: Achieve or maintain patent airway  12/6/2021 0611 by Mik Gray RN  Outcome: Ongoing  12/5/2021 1807 by Crys Pederson RN  Outcome: Ongoing  12/5/2021 1806 by Crys Pederson RN  Outcome: Ongoing     Problem: Gas Exchange - Impaired  Goal: Absence of hypoxia  12/6/2021 0611 by Mik Gray RN  Outcome: Ongoing  12/5/2021 1807 by Crys Pederson RN  Outcome: Ongoing  12/5/2021 1806 by Crys Pederson RN  Outcome: Ongoing  Goal: Promote optimal lung function  12/6/2021 0611 by Mik Gray RN  Outcome: Ongoing  12/5/2021 1807 by Crys Pederson RN  Outcome: Ongoing  12/5/2021 1806 by Crys Pederson RN  Outcome: Ongoing     Problem: Breathing Pattern - Ineffective  Goal: Ability to achieve and maintain a regular respiratory rate  12/6/2021 0611 by Mik Gray RN  Outcome: Ongoing  12/5/2021 1807 by Crys Pederson RN  Outcome: Ongoing  12/5/2021 1806 by Crys Pederson RN  Outcome: Ongoing     Problem:  Body Temperature -  Risk of, Imbalanced  Goal: Ability to maintain a body temperature within defined limits  12/6/2021 0611 by Mik Gray RN  Outcome: Ongoing  12/5/2021 1807 by Crys Pederson RN  Outcome: Ongoing  12/5/2021 1806 by Crys Pederson RN  Outcome: Ongoing  Goal: Will regain or maintain usual level of consciousness  12/6/2021 0611 by Mik Gray RN  Outcome: Ongoing  12/5/2021 1807 by Crys Pederson RN  Outcome: Ongoing  12/5/2021 1806 by Crys Pederson RN  Outcome: Ongoing  Goal: Complications related to the disease process, condition or treatment will be avoided or minimized  12/6/2021 0611 by Mik Gray RN  Outcome: Ongoing  12/5/2021 1807 by Crys Pederson RN  Outcome: Ongoing  12/5/2021 1806 by Crys Pederson RN  Outcome: Ongoing     Problem: Isolation Precautions - Risk of Spread of Infection  Goal: Prevent transmission of infection  12/6/2021 0611 by Bettejane Soulier Valarie Esposito RN  Outcome: Ongoing  12/5/2021 1807 by Carol Sheets RN  Outcome: Ongoing  12/5/2021 1806 by Carol Sheets RN  Outcome: Ongoing     Problem: Nutrition Deficits  Goal: Optimize nutritional status  12/6/2021 0611 by Maira Phillip RN  Outcome: Ongoing  12/5/2021 1807 by Carol Sheets RN  Outcome: Ongoing  12/5/2021 1806 by Carol Sheets RN  Outcome: Ongoing     Problem: Risk for Fluid Volume Deficit  Goal: Maintain normal heart rhythm  12/6/2021 0611 by Maira Phillip RN  Outcome: Ongoing  12/5/2021 1807 by Carol Sheets RN  Outcome: Ongoing  12/5/2021 1806 by Carol Sheets RN  Outcome: Ongoing  Goal: Maintain absence of muscle cramping  12/6/2021 0611 by Maira Phillip RN  Outcome: Ongoing  12/5/2021 1807 by Carol Sheets RN  Outcome: Ongoing  12/5/2021 1806 by Carol Sheets RN  Outcome: Ongoing  Goal: Maintain normal serum potassium, sodium, calcium, phosphorus, and pH  12/6/2021 0611 by Maira Phillip RN  Outcome: Ongoing  12/5/2021 1807 by Carol Sheets RN  Outcome: Ongoing  12/5/2021 1806 by Carol Sheets RN  Outcome: Ongoing     Problem: Loneliness or Risk for Loneliness  Goal: Demonstrate positive use of time alone when socialization is not possible  12/6/2021 0611 by Maira Phillip RN  Outcome: Ongoing  12/5/2021 1807 by Carol Sheets RN  Outcome: Ongoing  12/5/2021 1806 by Carol Sheets RN  Outcome: Ongoing     Problem: Fatigue  Goal: Verbalize increase energy and improved vitality  12/6/2021 0611 by Maira Phillip RN  Outcome: Ongoing  12/5/2021 1807 by Carol Sheets RN  Outcome: Ongoing  12/5/2021 1806 by Carol Sheets RN  Outcome: Ongoing     Problem: Patient Education: Go to Patient Education Activity  Goal: Patient/Family Education  12/6/2021 9817 by Maira Phillip RN  Outcome: Ongoing  12/5/2021 1807 by Carol Sheets RN  Outcome: Ongoing  12/5/2021 1806 by Carol Sheets RN  Outcome: Ongoing Problem: Pain:  Goal: Pain level will decrease  Description: Pain level will decrease  12/6/2021 0611 by Sin Vigil RN  Outcome: Ongoing  12/5/2021 1807 by Kurtis Coleman RN  Outcome: Ongoing  12/5/2021 1806 by Kurtis Coleman RN  Outcome: Ongoing  Goal: Control of acute pain  Description: Control of acute pain  12/6/2021 0611 by Sin Vigil RN  Outcome: Ongoing  12/5/2021 1807 by Kurtis Coleman RN  Outcome: Ongoing  12/5/2021 1806 by Kurtis Coleman RN  Outcome: Ongoing  Goal: Control of chronic pain  Description: Control of chronic pain  12/6/2021 0611 by Sin Vigil RN  Outcome: Ongoing  12/5/2021 1807 by Kurtis Coleman RN  Outcome: Ongoing  12/5/2021 1806 by Kurtis Coleman RN  Outcome: Ongoing     Problem: Falls - Risk of:  Goal: Will remain free from falls  Description: Will remain free from falls  Outcome: Ongoing  Goal: Absence of physical injury  Description: Absence of physical injury  Outcome: Ongoing

## 2021-12-06 NOTE — PLAN OF CARE
Problem: Airway Clearance - Ineffective  Goal: Achieve or maintain patent airway  12/6/2021 0611 by Kait Holly RN  Outcome: Ongoing     Problem: Gas Exchange - Impaired  Goal: Absence of hypoxia  12/6/2021 1734 by Josefina Merida RN  Outcome: Ongoing  12/6/2021 0611 by Kait Holly RN  Outcome: Ongoing  Goal: Promote optimal lung function  12/6/2021 1734 by Josefina Merida RN  Outcome: Ongoing  12/6/2021 0611 by Kait Holly RN  Outcome: Ongoing     Problem: Breathing Pattern - Ineffective  Goal: Ability to achieve and maintain a regular respiratory rate  12/6/2021 0611 by Kait Holly RN  Outcome: Ongoing     Problem:  Body Temperature -  Risk of, Imbalanced  Goal: Ability to maintain a body temperature within defined limits  12/6/2021 1734 by Josefina Merida RN  Outcome: Ongoing  12/6/2021 0611 by Kait Holly RN  Outcome: Ongoing  Goal: Will regain or maintain usual level of consciousness  12/6/2021 1734 by Josefina Merida RN  Outcome: Ongoing  12/6/2021 0611 by Kait Holly RN  Outcome: Ongoing  Goal: Complications related to the disease process, condition or treatment will be avoided or minimized  12/6/2021 1734 by Josefina Merida RN  Outcome: Ongoing  12/6/2021 0611 by Kait Holly RN  Outcome: Ongoing     Problem: Isolation Precautions - Risk of Spread of Infection  Goal: Prevent transmission of infection  12/6/2021 0611 by Kait Holly RN  Outcome: Ongoing     Problem: Nutrition Deficits  Goal: Optimize nutritional status  12/6/2021 0611 by Kait Holly RN  Outcome: Ongoing     Problem: Risk for Fluid Volume Deficit  Goal: Maintain normal heart rhythm  12/6/2021 1734 by Josefina Merida RN  Outcome: Ongoing  12/6/2021 0611 by Kait Holly RN  Outcome: Ongoing  Goal: Maintain absence of muscle cramping  12/6/2021 1734 by Josefian Merida RN  Outcome: Ongoing  12/6/2021 0611 by Kait Holly RN  Outcome: Ongoing  Goal: Maintain normal serum potassium, sodium, calcium, phosphorus, and pH  12/6/2021 1734 by Vandana Lentz RN  Outcome: Ongoing  12/6/2021 0611 by Jessie Knott RN  Outcome: Ongoing     Problem: Loneliness or Risk for Loneliness  Goal: Demonstrate positive use of time alone when socialization is not possible  12/6/2021 0611 by Jessie Knott RN  Outcome: Ongoing     Problem: Fatigue  Goal: Verbalize increase energy and improved vitality  12/6/2021 1734 by Vandana Lentz RN  Outcome: Ongoing  12/6/2021 0611 by Jessie Knott RN  Outcome: Ongoing     Problem: Patient Education: Go to Patient Education Activity  Goal: Patient/Family Education  12/6/2021 9853 by Jessie Knott RN  Outcome: Ongoing     Problem: Pain:  Goal: Pain level will decrease  Description: Pain level will decrease  12/6/2021 1734 by Vandana Lentz RN  Outcome: Ongoing  12/6/2021 0611 by Jessie Knott RN  Outcome: Ongoing  Goal: Control of acute pain  Description: Control of acute pain  12/6/2021 1734 by Vandana Lentz RN  Outcome: Ongoing  12/6/2021 0611 by Jessie Knott RN  Outcome: Ongoing  Goal: Control of chronic pain  Description: Control of chronic pain  12/6/2021 1734 by Vandana Lentz RN  Outcome: Ongoing  12/6/2021 0611 by Jessie Knott RN  Outcome: Ongoing     Problem: Falls - Risk of:  Goal: Will remain free from falls  Description: Will remain free from falls  12/6/2021 1734 by Vandana Lentz RN  Outcome: Ongoing  12/6/2021 0611 by Jessie Knott RN  Outcome: Ongoing  Goal: Absence of physical injury  Description: Absence of physical injury  12/6/2021 1734 by Vandana Lentz RN  Outcome: Ongoing  12/6/2021 0611 by Jessie Knott RN  Outcome: Ongoing

## 2021-12-07 LAB
C-REACTIVE PROTEIN: 29.1 MG/L (ref 0–5)
CULTURE: ABNORMAL
CULTURE: ABNORMAL
DIRECT EXAM: ABNORMAL
Lab: ABNORMAL
SPECIMEN DESCRIPTION: ABNORMAL

## 2021-12-07 PROCEDURE — 99233 SBSQ HOSP IP/OBS HIGH 50: CPT | Performed by: INTERNAL MEDICINE

## 2021-12-07 PROCEDURE — 36415 COLL VENOUS BLD VENIPUNCTURE: CPT

## 2021-12-07 PROCEDURE — 2580000003 HC RX 258: Performed by: NURSE PRACTITIONER

## 2021-12-07 PROCEDURE — 6360000002 HC RX W HCPCS: Performed by: INTERNAL MEDICINE

## 2021-12-07 PROCEDURE — 6360000002 HC RX W HCPCS: Performed by: NURSE PRACTITIONER

## 2021-12-07 PROCEDURE — 2060000000 HC ICU INTERMEDIATE R&B

## 2021-12-07 PROCEDURE — 6370000000 HC RX 637 (ALT 250 FOR IP): Performed by: NURSE PRACTITIONER

## 2021-12-07 PROCEDURE — 99232 SBSQ HOSP IP/OBS MODERATE 35: CPT | Performed by: INTERNAL MEDICINE

## 2021-12-07 PROCEDURE — 86140 C-REACTIVE PROTEIN: CPT

## 2021-12-07 PROCEDURE — 2580000003 HC RX 258: Performed by: INTERNAL MEDICINE

## 2021-12-07 PROCEDURE — 6370000000 HC RX 637 (ALT 250 FOR IP): Performed by: INTERNAL MEDICINE

## 2021-12-07 RX ADMIN — SODIUM CHLORIDE, PRESERVATIVE FREE 10 ML: 5 INJECTION INTRAVENOUS at 09:36

## 2021-12-07 RX ADMIN — ENOXAPARIN SODIUM 30 MG: 100 INJECTION SUBCUTANEOUS at 09:38

## 2021-12-07 RX ADMIN — SODIUM CHLORIDE, PRESERVATIVE FREE 10 ML: 5 INJECTION INTRAVENOUS at 21:34

## 2021-12-07 RX ADMIN — AMPICILLIN SODIUM AND SULBACTAM SODIUM 3000 MG: 2; 1 INJECTION, POWDER, FOR SOLUTION INTRAMUSCULAR; INTRAVENOUS at 20:00

## 2021-12-07 RX ADMIN — Medication 6000 UNITS: at 09:37

## 2021-12-07 RX ADMIN — AMPICILLIN SODIUM AND SULBACTAM SODIUM 3000 MG: 2; 1 INJECTION, POWDER, FOR SOLUTION INTRAMUSCULAR; INTRAVENOUS at 01:38

## 2021-12-07 RX ADMIN — BARICITINIB 4 MG: 2 TABLET, FILM COATED ORAL at 09:37

## 2021-12-07 RX ADMIN — ENOXAPARIN SODIUM 30 MG: 100 INJECTION SUBCUTANEOUS at 21:33

## 2021-12-07 RX ADMIN — AMPICILLIN SODIUM AND SULBACTAM SODIUM 3000 MG: 2; 1 INJECTION, POWDER, FOR SOLUTION INTRAMUSCULAR; INTRAVENOUS at 14:30

## 2021-12-07 RX ADMIN — AMPICILLIN SODIUM AND SULBACTAM SODIUM 3000 MG: 2; 1 INJECTION, POWDER, FOR SOLUTION INTRAMUSCULAR; INTRAVENOUS at 09:36

## 2021-12-07 RX ADMIN — DEXAMETHASONE 10 MG: 4 TABLET ORAL at 09:37

## 2021-12-07 ASSESSMENT — PAIN SCALES - GENERAL
PAINLEVEL_OUTOF10: 0

## 2021-12-07 ASSESSMENT — ENCOUNTER SYMPTOMS
SORE THROAT: 1
COUGH: 1
EYE ITCHING: 0
ABDOMINAL DISTENTION: 0
COLOR CHANGE: 0
SHORTNESS OF BREATH: 1

## 2021-12-07 NOTE — PROGRESS NOTES
Physician Progress Note      PATIENT:               Caro Armstrong  CSN #:                  [id number]  :                       1980  ADMIT DATE:       2021 7:54 PM  [address] [place] DATE:  RESPONDING  PROVIDER #:        Alfonso Fofana MD          QUERY TEXT:    Pt admitted with COVID pneumonia. Pt noted to have hypoxia. If possible,   please document in the progress notes and discharge summary if you are   evaluating and/or treating any of the following: The medical record reflects the following:  Risk Factors: COVID pneumonia  Clinical Indicators: per H&P \"Covid + on o2 for hypoxia into the high 80s on   room air. \", pt is not on home O2  Treatment: continuous O2 @ 4L to keep sats above 90, Decadron, Baricitinib,   CXR, ID consult, droplet plus isolation    Call if any questions. Thank you, Mau Beckford, [address] [place] [address]  Options provided:  -- Acute respiratory failure with hypoxia  -- Hypoxia without respiratory failure  -- Other - I will add my own diagnosis  -- Disagree - Not applicable / Not valid  -- Disagree - Clinically unable to determine / Unknown  -- Refer to Clinical Documentation Reviewer    PROVIDER RESPONSE TEXT:    This patient is in acute respiratory failure with hypoxia.     Query created by: Keena Funes on 2021 6:41 AM      Electronically signed by:  Alfonso Fofana MD 2021 4:20 PM

## 2021-12-07 NOTE — PROGRESS NOTES
[facility]  Office: [address], DO, Jessica Evon, DO, Raquel Peña, DO, Star Roman Blood, DO, Vivian Neumann MD, Jeanette Wagner MD, Misti Esquivel MD, Richie Tsai MD, Fredy Myles MD, Sarah Beth Naranjo MD, Fransisco Powers MD, Galdino Agosto, DO, Tiago Tam, DO, Candace Medina MD,  Sergei Cesar DO, Mayra Kraft MD, Violet Jovel MD, Anisa Jama MD, Mya Vo MD, Rohan Yan MD, Vianey Laird MD, Cirilo Minaya MD, Martha Beaulieu, [facility], [facility], CNP, Connor Nogueira, CNP, Mary Graves, CNS, Alanna Carrillo, [facility], Alexandre Grace, CNP, Kellie Christianson, [facility], Claudia Rod, [facility], Jhony Eldridge, CNP, Rowan Oppenheim, PA-C, Nia Chin, [facility], Eldon Osorio, CNP, Lovely Segal, CNP, Karen Estes, CNP, Minh Lacy, CNP, Lea Zaidi, CNP, Brittney Siegel, CNP, Heidi Hwang, [address] [place] [address]    Progress Note    12/7/2021    11:08 AM    Name:   Dane Haynes  MRN:     [id number]     [place]:      [de-identified]   Room:   [address] [place] [address] Day:  3  Admit Date:  12/4/2021  7:54 PM    PCP:   Rebeca Chi  Code Status:  Full Code    Subjective:     C/C:   Chief Complaint   Patient presents with    Abscess     Tx from [place] ED to see ENT for a peritonsilar abscess. No airway compromise. Covid + on o2 for hypoxia into the high 80s on room air. Interval History Status: Improved    Patient reports feeling better this morning. Patient denies any complaints at this time. He continues to be on oxygen. Patient is wondering about his discharge. Brief History:     Dane Haynes is a 35 y.o. M who presented to [place] ED with sore throat and hemoptysis. Symptoms going on for the past day. Found to have left peritonsillar swelling with uvula deviation.  CT scan showed left peritonsillar abscess measuring 3.6 x 1.9 x 1.9 cm and small bilateral upper lobe infiltrates/ ground glass infiltrates t/o bilateral lungs, No PE. COVID positive. Did not get vaccine. Transferred to [facility].     Underwent I and D per ENT  Hypoxic requiring O2  Started on COVID treatment per ID    Review of Systems:     Constitutional:  negative for chills, fevers, sweats  Respiratory:  negative for cough  Cardiovascular:  negative for chest pain, chest pressure/discomfort, lower extremity edema, palpitations  Gastrointestinal:  negative for abdominal pain, constipation, diarrhea, nausea, vomiting  Neurological:  negative for dizziness, headache    Medications: Allergies:  No Known Allergies    Current Meds:   Scheduled Meds:    baricitinib  4 mg Oral Daily    sodium chloride flush  5-40 mL IntraVENous 2 times per day    enoxaparin  30 mg SubCUTAneous BID    Vitamin D  6,000 Units Oral Daily    Followed by   Abdiel Cosme ON 2021] Vitamin D  2,000 Units Oral Daily    ampicillin-sulbactam  3,000 mg IntraVENous Q6H    dexamethasone  10 mg Oral Daily     Continuous Infusions:    sodium chloride       PRN Meds: sodium chloride flush, sodium chloride, ondansetron **OR** ondansetron, polyethylene glycol, acetaminophen **OR** acetaminophen, dextromethorphan-guaiFENesin    Data:     Past Medical History:   has no past medical history on file. Social History:   reports that he has been smoking. He started smoking about 9 years ago. He has been smoking about 0.25 packs per day. He has never used smokeless tobacco. He reports current alcohol use. He reports that he does not use drugs.      Family History:   Family History   Problem Relation Age of Onset    Other Mother     Heart Disease Father     High Blood Pressure Father     High Cholesterol Father     Cancer Father        Vitals:  /73   Pulse (!) 41   Temp 97.9 °F (36.6 °C) (Oral)   Resp 17   Ht 6' (1.829 m)   Wt 201 lb 1.6 oz (91.2 kg)   SpO2 93%   BMI 27.27 kg/m²   Temp (24hrs), Av.1 °F (36.7 °C), Min:97.7 °F (36.5 °C), Max:98.6 °F (37 °C)    No results for input(s): POCGLU in the last 72 hours. I/O (24Hr): Intake/Output Summary (Last 24 hours) at 12/7/2021 1108  Last data filed at 12/6/2021 2218  Gross per 24 hour   Intake --   Output 250 ml   Net -250 ml       Labs:  Hematology:  Recent Labs     12/04/21  1242 12/04/21  1249 12/05/21  1120 12/06/21  0603 12/07/21  0548   WBC 5.4  --  7.9  --   --    RBC 4.88  --  5.00  --   --    HGB 15.0  --  15.4  --   --    HCT 45.0  --  45.6  --   --    MCV 92.2  --  91.2  --   --    MCH 30.7  --  30.8  --   --    MCHC 33.3  --  33.8  --   --    RDW 11.9  --  11.9  --   --      --  204  --   --    MPV 9.7  --  9.9  --   --    CRP  --   --  92.4* 47.6* 29.1*   DDIMER  --  1.24*  --   --   --      Chemistry:  Recent Labs     12/04/21  1249 12/04/21  1444 12/05/21  1120     --  139   K 3.9  --  4.8     --  101   CO2 28  --  28   GLUCOSE 125*  --  116*   BUN 13  --  16   CREATININE 0.93  --  0.82   ANIONGAP 9  --  10   LABGLOM >60  --  >60   GFRAA >60  --  >60   CALCIUM 8.7  --  9.2   PROBNP 86  --   --    TROPHS <6 6  --      Recent Labs     12/04/21  1249 12/05/21  1120   PROT 6.5 6.6   LABALBU 3.7 3.7   AST 26 23   ALT 20 15   ALKPHOS 70 67   BILITOT 0.40 0.48   BILIDIR 0.16  --      ABG:No results found for: POCPH, PHART, PH, POCPCO2, NUA6QIH, PCO2, POCPO2, PO2ART, PO2, POCHCO3, SKB8XKD, HCO3, NBEA, PBEA, BEART, BE, THGBART, THB, OHQ1ADX, DAOM9ZFD, H6DMRPSX, O2SAT, FIO2  Lab Results   Component Value Date/Time    SPECIAL NOT REPORTED 12/04/2021 10:34 PM     Lab Results   Component Value Date/Time    CULTURE ORAL BEN PRESENT 12/04/2021 10:34 PM    CULTURE MIXED ANAEROBIC BEN (A) 12/04/2021 10:34 PM       Radiology:  CT SOFT TISSUE NECK W CONTRAST    Result Date: 12/4/2021  Left peritonsillar abscess measuring 3.6 x 1.9 x 1.9 cm. Small bilateral upper lobe infiltrates may represent multifocal pneumonia. COVID-19 pneumonia is a possibility.      CT CHEST PULMONARY EMBOLISM W CONTRAST    Result Date: 12/4/2021  Ground-glass opacity is seen throughout the lungs bilaterally consistent with atypical pneumonia. No acute or chronic pulmonary embolism.        Physical Examination:        General appearance:  alert, cooperative and no distress, on NC  Mental Status:  oriented to person, place and time and normal affect  Lungs:  clear to auscultation bilaterally, normal effort  Heart:  regular rate and rhythm  Abdomen:  soft, nontender, nondistended, normal bowel sounds  Extremities:  no edema, redness, tenderness in the calves  Skin:  no gross lesions, rashes, induration    Assessment:        Hospital Problems           Last Modified POA    * (Principal) Pneumonia due to COVID-19 virus 12/5/2021 Yes    Tonsillar abscess 12/4/2021 Yes          Plan:        - Vitals, labs, imaging, medications reviewed  - ENT consulted - s/p I and D (12/4) - continue unasyn  - COVID positive - droplet plus isolation  - ID consult - decadron, barcitinib   - Wean O2 as tolerated   - Decadron   - Monitor O2 requirements - currently requiring 4 L  - DC planning when O2 requirements decreasing  -We will discuss with ID regarding oral antibiotic prior to discharge    Gavin Falcon MD  12/7/2021  11:08 AM

## 2021-12-07 NOTE — PROGRESS NOTES
[facility] -   Infectious diseases evaluation  admission date 12/4/2021    reason for consultation:   covid and tonsillitis    Impression :   Current:  · covid diffuse pneumonia   · Left tonsillar edema -abscess 3.6 - 1.9 - 1.9 cm  · Drained 12/4 per ENT    Other:  ·   Discussion / summary of stay / plan of care   ·   Recommendations   · Iv unasyn for tonsillar abscess  · Decadron 10 days - 10 mg  · ENT - did drain the abscess on the bedside, lot of pus, cx pend  · Barcitinib - 12/5  · Watch tonsillar abscess  · Will follow CRP ferritin    Infection Control Recommendations   · [place] Precautions  · Contact Isolation   · Airborne isolation  · Droplet Isolation      Antimicrobial Stewardship Recommendations   · Simplification of therapy  · Targeted therapy  Coordination ofOutpatient Care:   · Estimated Length of IV antimicrobials:  · Patient will need Midline / picc Catheter Insertion:   · Patient will need SNF:  · Patient will need outpatient wound care:     History of Present Illness:   Initial history:  Yulia Wolf is a 35y.o.-year-old male w a week cough and aches,  Pains sore throat, coughed blood today and came in - ill x 11 days - CT chest covid diffuse pneumonia   covid + 5 days PTA  Non vaccinated  Exam left tonsillar edema w uvula shift. No exudates seen - drained by ENt in ER lots of pus  Sent for cx        Interval changes  12/7/2021   Patient Vitals for the past 8 hrs:   BP Temp Temp src Pulse Resp   12/06/21 2043 (!) 142/99 98.4 °F (36.9 °C) Oral 56 18   12/5,  Sore throat improved, left tonsillar edema remains  CRP 92  Oxygen requirement increased 4 L of nasal cannula  [id number]   Ferritin1,050 High      We will start baricitinib and follow on the tonsillitis and on CRP  Discussed with patient    12/6  Saturating 93%, feeling  better nasal cannula 3 L.   Unfortunately he desaturates when he moves  CRP improved 47      Summary of relevant labs:  Labs:  W 5.4  VET01 -  47  [id number]   Ferritin1,050 High  Micro:  covid +  Imaging:  CT chest diffuse covid    I have personally reviewed the past medical history, past surgical history, medications, social history, and family history, and I haveupdated the database accordingly. Allergies:   Patient has no known allergies. Review of Systems:     Review of Systems   Constitutional: Positive for activity change. HENT: Positive for sore throat. Negative for congestion. Eyes: Negative for itching. Respiratory: Positive for cough and shortness of breath. Cardiovascular: Negative for chest pain. Gastrointestinal: Negative for abdominal distention. Endocrine: Negative for heat intolerance, polydipsia and polyphagia. Genitourinary: Negative for dysuria. Musculoskeletal: Negative for arthralgias. Skin: Negative for color change. Allergic/Immunologic: Negative for immunocompromised state. Neurological: Negative for dizziness, light-headedness and headaches. Hematological: Negative for adenopathy. Psychiatric/Behavioral: Negative for agitation. Physical Examination :       Physical Exam  Constitutional:       Appearance: Normal appearance. He is normal weight. HENT:      Head: Normocephalic and atraumatic. Nose: Nose normal.      Mouth/Throat:      Mouth: Mucous membranes are moist.      Pharynx: Posterior oropharyngeal erythema present. Eyes:      General: No scleral icterus. Conjunctiva/sclera: Conjunctivae normal.   Cardiovascular:      Rate and Rhythm: Normal rate and regular rhythm. Heart sounds: Normal heart sounds. No murmur heard. Pulmonary:      Breath sounds: No stridor. No rhonchi. Abdominal:      General: There is no distension. Palpations: Abdomen is soft. Tenderness: There is no abdominal tenderness.    Genitourinary:     Comments: No vail  Musculoskeletal:         General: No swelling, tenderness, deformity or signs of injury. Cervical back: Neck supple. No rigidity. Skin:     Coloration: Skin is not jaundiced or pale. Findings: No bruising or erythema. Neurological:      General: No focal deficit present. Mental Status: He is alert and oriented to person, place, and time. Psychiatric:         Mood and Affect: Mood normal.         Thought Content: Thought content normal.         Past Medical History:   No past medical history on file. Past Surgical  History:     Past Surgical History:   Procedure Laterality Date    EKG 12-LEAD  1/4/2016         SINUS SURGERY         Medications:      baricitinib  4 mg Oral Daily    sodium chloride flush  5-40 mL IntraVENous 2 times per day    enoxaparin  30 mg SubCUTAneous BID    Vitamin D  6,000 Units Oral Daily    Followed by   Anish Mcmanus ON 12/12/2021] Vitamin D  2,000 Units Oral Daily    ampicillin-sulbactam  3,000 mg IntraVENous Q6H    dexamethasone  10 mg Oral Daily       Social History:     Social History     Socioeconomic History    Marital status:      Spouse name: Not on file    Number of children: Not on file    Years of education: Not on file    Highest education level: Not on file   Occupational History    Not on file   Tobacco Use    Smoking status: Light Tobacco Smoker     Packs/day: 0.25     Start date: 11/18/2012    Smokeless tobacco: Never Used   Substance and Sexual Activity    Alcohol use: Yes     Comment: occasional    Drug use: No    Sexual activity: Yes     Partners: Female   Other Topics Concern    Not on file   Social History Narrative    Not on file     Social Determinants of Health     Financial Resource Strain: Low Risk     Difficulty of Paying Living Expenses: Not hard at all   Food Insecurity: No Food Insecurity    Worried About Running Out of Food in the Last Year: Never true    Lakisha of Food in the Last Year: Never true   Transportation Needs:     Lack of Transportation (Medical):  Not on file    Lack of Transportation (Non-Medical): Not on file   Physical Activity:     Days of Exercise per Week: Not on file    Minutes of Exercise per Session: Not on file   Stress:     Feeling of Stress : Not on file   Social Connections:     Frequency of Communication with Friends and Family: Not on file    Frequency of Social Gatherings with Friends and Family: Not on file    Attends Anabaptism Services: Not on file    Active Member of [address] [place] [address] [employer] or Organizations: Not on file    Attends Club or Organization Meetings: Not on file    Marital Status: Not on file   Intimate Partner Violence:     Fear of Current or Ex-Partner: Not on file    Emotionally Abused: Not on file    Physically Abused: Not on file    Sexually Abused: Not on file   Housing Stability:     Unable to Pay for Housing in the Last Year: Not on file    Number of [place] in the Last Year: Not on file    Unstable Housing in the Last Year: Not on file       Family History:     Family History   Problem Relation Age of Onset    Other Mother     Heart Disease Father     High Blood Pressure Father     High Cholesterol Father     Cancer Father       Medical Decision Making:   I have independently reviewed/ordered the following labs:    CBC with Differential:   Recent Labs     12/04/21  1242 12/05/21  1120   WBC 5.4 7.9   HGB 15.0 15.4   HCT 45.0 45.6    204   LYMPHOPCT 13* 13*   MONOPCT 7 7     BMP:  Recent Labs     12/04/21  1249 12/05/21  1120    139   K 3.9 4.8    101   CO2 28 28   BUN 13 16   CREATININE 0.93 0.82     Hepatic Function Panel:   Recent Labs     12/04/21  1249 12/05/21  1120   PROT 6.5 6.6   LABALBU 3.7 3.7   BILIDIR 0.16  --    IBILI 0.24  --    BILITOT 0.40 0.48   ALKPHOS 70 67   ALT 20 15   AST 26 23     No results for input(s): RPR in the last 72 hours. No results for input(s): HIV in the last 72 hours. No results for input(s): BC in the last 72 hours.   Lab Results   Component Value Date    CREATININE 0.82 12/05/2021    GLUCOSE 116 12/05/2021       Detailed results: Thank you for allowing us to participate in the care of this patient. Please call with questions. This note is created with the assistance of a speech recognition program.  While intending to generate adocument that actually reflects the content of the visit, the document can still have some errors including those of syntax and sound a like substitutions which may escape proof reading. It such instances, actual meaningcan be extrapolated by contextual diversion.     Rod Herbert MD  Office: [phone]  Perfect serve / office [phone]

## 2021-12-07 NOTE — PLAN OF CARE
Problem: Airway Clearance - Ineffective  Goal: Achieve or maintain patent airway  Outcome: Ongoing     Problem: Gas Exchange - Impaired  Goal: Absence of hypoxia  Outcome: Ongoing  Goal: Promote optimal lung function  Outcome: Ongoing     Problem: Breathing Pattern - Ineffective  Goal: Ability to achieve and maintain a regular respiratory rate  Outcome: Ongoing     Problem:  Body Temperature -  Risk of, Imbalanced  Goal: Ability to maintain a body temperature within defined limits  Outcome: Ongoing  Goal: Will regain or maintain usual level of consciousness  Outcome: Ongoing  Goal: Complications related to the disease process, condition or treatment will be avoided or minimized  Outcome: Ongoing     Problem: Isolation Precautions - Risk of Spread of Infection  Goal: Prevent transmission of infection  Outcome: Ongoing     Problem: Nutrition Deficits  Goal: Optimize nutritional status  Outcome: Ongoing     Problem: Risk for Fluid Volume Deficit  Goal: Maintain normal heart rhythm  Outcome: Ongoing  Goal: Maintain absence of muscle cramping  Outcome: Ongoing  Goal: Maintain normal serum potassium, sodium, calcium, phosphorus, and pH  Outcome: Ongoing     Problem: Loneliness or Risk for Loneliness  Goal: Demonstrate positive use of time alone when socialization is not possible  Outcome: Ongoing     Problem: Fatigue  Goal: Verbalize increase energy and improved vitality  Outcome: Ongoing     Problem: Patient Education: Go to Patient Education Activity  Goal: Patient/Family Education  Outcome: Ongoing     Problem: Pain:  Goal: Pain level will decrease  Description: Pain level will decrease  Outcome: Ongoing  Goal: Control of acute pain  Description: Control of acute pain  Outcome: Ongoing  Goal: Control of chronic pain  Description: Control of chronic pain  Outcome: Ongoing     Problem: Falls - Risk of:  Goal: Will remain free from falls  Description: Will remain free from falls  Outcome: Ongoing  Goal: Absence of physical injury  Description: Absence of physical injury  Outcome: Ongoing

## 2021-12-07 NOTE — PLAN OF CARE
Problem: Airway Clearance - Ineffective  Goal: Achieve or maintain patent airway  Outcome: Ongoing     Problem: Gas Exchange - Impaired  Goal: Absence of hypoxia  12/6/2021 2209 by Yeison Vela RN  Outcome: Ongoing  12/6/2021 1734 by Joya Freeman RN  Outcome: Ongoing  Goal: Promote optimal lung function  12/6/2021 2209 by Yeison Vela RN  Outcome: Ongoing  12/6/2021 1734 by Joya Freeman RN  Outcome: Ongoing     Problem: Breathing Pattern - Ineffective  Goal: Ability to achieve and maintain a regular respiratory rate  Outcome: Ongoing     Problem:  Body Temperature -  Risk of, Imbalanced  Goal: Ability to maintain a body temperature within defined limits  12/6/2021 2209 by Yeison Vela RN  Outcome: Ongoing  12/6/2021 1734 by Joya Freeman RN  Outcome: Ongoing  Goal: Will regain or maintain usual level of consciousness  12/6/2021 2209 by Yeison Vela RN  Outcome: Ongoing  12/6/2021 1734 by Joya Freeman RN  Outcome: Ongoing  Goal: Complications related to the disease process, condition or treatment will be avoided or minimized  12/6/2021 2209 by Yeison Vela RN  Outcome: Ongoing  12/6/2021 1734 by Joya Freeman RN  Outcome: Ongoing     Problem: Isolation Precautions - Risk of Spread of Infection  Goal: Prevent transmission of infection  Outcome: Ongoing     Problem: Nutrition Deficits  Goal: Optimize nutritional status  Outcome: Ongoing     Problem: Risk for Fluid Volume Deficit  Goal: Maintain normal heart rhythm  12/6/2021 2209 by Yeison Vela RN  Outcome: Ongoing  12/6/2021 1734 by Joya Freeman RN  Outcome: Ongoing  Goal: Maintain absence of muscle cramping  12/6/2021 2209 by Yeison Vela RN  Outcome: Ongoing  12/6/2021 1734 by Joya Freeman RN  Outcome: Ongoing  Goal: Maintain normal serum potassium, sodium, calcium, phosphorus, and pH  12/6/2021 2209 by Yeison Vela RN  Outcome: Ongoing  12/6/2021 1734 by Joya Freeman RN  Outcome: Ongoing     Problem: Loneliness or Risk for Loneliness  Goal: Demonstrate positive use of time alone when socialization is not possible  Outcome: Ongoing     Problem: Fatigue  Goal: Verbalize increase energy and improved vitality  12/6/2021 2209 by Zaida Loving RN  Outcome: Ongoing  12/6/2021 1734 by Thiago Beltran RN  Outcome: Ongoing     Problem: Patient Education: Go to Patient Education Activity  Goal: Patient/Family Education  Outcome: Ongoing     Problem: Pain:  Goal: Pain level will decrease  Description: Pain level will decrease  12/6/2021 2209 by Zaida Loving RN  Outcome: Ongoing  12/6/2021 1734 by Thiago Beltran RN  Outcome: Ongoing  Goal: Control of acute pain  Description: Control of acute pain  12/6/2021 2209 by Zaida Loving RN  Outcome: Ongoing  12/6/2021 1734 by Thiago Beltran RN  Outcome: Ongoing  Goal: Control of chronic pain  Description: Control of chronic pain  12/6/2021 2209 by Zaida Loving RN  Outcome: Ongoing  12/6/2021 1734 by Thiago Beltran RN  Outcome: Ongoing     Problem: Falls - Risk of:  Goal: Will remain free from falls  Description: Will remain free from falls  12/6/2021 2209 by Zaida Loving RN  Outcome: Ongoing  12/6/2021 1734 by Thiago Beltran RN  Outcome: Ongoing  Goal: Absence of physical injury  Description: Absence of physical injury  12/6/2021 2209 by Zaida Loving RN  Outcome: Ongoing  12/6/2021 1734 by Thiago Beltran RN  Outcome: Ongoing

## 2021-12-08 VITALS
HEART RATE: 69 BPM | TEMPERATURE: 98.1 F | BODY MASS INDEX: 27.24 KG/M2 | RESPIRATION RATE: 21 BRPM | OXYGEN SATURATION: 94 % | HEIGHT: 72 IN | DIASTOLIC BLOOD PRESSURE: 87 MMHG | SYSTOLIC BLOOD PRESSURE: 123 MMHG | WEIGHT: 201.1 LBS

## 2021-12-08 PROCEDURE — 2580000003 HC RX 258: Performed by: NURSE PRACTITIONER

## 2021-12-08 PROCEDURE — 2580000003 HC RX 258: Performed by: INTERNAL MEDICINE

## 2021-12-08 PROCEDURE — 6360000002 HC RX W HCPCS: Performed by: NURSE PRACTITIONER

## 2021-12-08 PROCEDURE — 6370000000 HC RX 637 (ALT 250 FOR IP): Performed by: NURSE PRACTITIONER

## 2021-12-08 PROCEDURE — 6360000002 HC RX W HCPCS: Performed by: INTERNAL MEDICINE

## 2021-12-08 PROCEDURE — 6370000000 HC RX 637 (ALT 250 FOR IP): Performed by: INTERNAL MEDICINE

## 2021-12-08 PROCEDURE — 99239 HOSP IP/OBS DSCHRG MGMT >30: CPT | Performed by: INTERNAL MEDICINE

## 2021-12-08 RX ORDER — DEXAMETHASONE 2 MG/1
10 TABLET ORAL DAILY
Qty: 30 TABLET | Refills: 0 | Status: SHIPPED | OUTPATIENT
Start: 2021-12-09 | End: 2021-12-15

## 2021-12-08 RX ORDER — AMOXICILLIN AND CLAVULANATE POTASSIUM 875; 125 MG/1; MG/1
1 TABLET, FILM COATED ORAL 2 TIMES DAILY
Qty: 20 TABLET | Refills: 0 | Status: SHIPPED | OUTPATIENT
Start: 2021-12-08 | End: 2021-12-18

## 2021-12-08 RX ORDER — AMOXICILLIN 500 MG/1
1000 CAPSULE ORAL 2 TIMES DAILY
Qty: 56 CAPSULE | Refills: 0 | Status: SHIPPED | OUTPATIENT
Start: 2021-12-08 | End: 2021-12-08 | Stop reason: HOSPADM

## 2021-12-08 RX ADMIN — AMPICILLIN SODIUM AND SULBACTAM SODIUM 3000 MG: 2; 1 INJECTION, POWDER, FOR SOLUTION INTRAMUSCULAR; INTRAVENOUS at 02:30

## 2021-12-08 RX ADMIN — AMPICILLIN SODIUM AND SULBACTAM SODIUM 3000 MG: 2; 1 INJECTION, POWDER, FOR SOLUTION INTRAMUSCULAR; INTRAVENOUS at 08:38

## 2021-12-08 RX ADMIN — Medication 6000 UNITS: at 08:40

## 2021-12-08 RX ADMIN — SODIUM CHLORIDE, PRESERVATIVE FREE 10 ML: 5 INJECTION INTRAVENOUS at 08:50

## 2021-12-08 RX ADMIN — BARICITINIB 4 MG: 2 TABLET, FILM COATED ORAL at 08:39

## 2021-12-08 RX ADMIN — ENOXAPARIN SODIUM 30 MG: 100 INJECTION SUBCUTANEOUS at 08:39

## 2021-12-08 RX ADMIN — DEXAMETHASONE 10 MG: 4 TABLET ORAL at 08:40

## 2021-12-08 ASSESSMENT — PAIN SCALES - GENERAL
PAINLEVEL_OUTOF10: 0
PAINLEVEL_OUTOF10: 0

## 2021-12-08 NOTE — PLAN OF CARE
Ok for DC on augmentin bid x 10 days and was reconsiled  finish the 10 days of decadron that already started    barcitinib cant be given outpt  Needs ENT FU outpt    Disc w Dr Cathleen Candelaria.     Gogo Quinonez MD. Infectious Diseases

## 2021-12-08 NOTE — PROGRESS NOTES
[facility] -   Infectious diseases evaluation  admission date 12/4/2021    reason for consultation:   covid and tonsillitis    Impression :   Current:  · covid diffuse pneumonia   · Left tonsillar edema -abscess 3.6 - 1.9 - 1.9 cm  · Drained 12/4 per ENT    Other:  ·   Discussion / summary of stay / plan of care   ·   Recommendations   · Iv unasyn for tonsillar abscess  · Decadron 10 days - 10 mg  · ENT - did drain the abscess on the bedside, lot of pus, cx pend mixed bacterial  · Barcitinib - 12/5  · Watch tonsillar abscess, seems to be controlled  · Will follow CRP ferritin    Infection Control Recommendations   · [place] Precautions  · Contact Isolation   · Airborne isolation  · Droplet Isolation      Antimicrobial Stewardship Recommendations   · Simplification of therapy  · Targeted therapy  Coordination ofOutpatient Care:   · Estimated Length of IV antimicrobials:  · Patient will need Midline / picc Catheter Insertion:   · Patient will need SNF:  · Patient will need outpatient wound care:     History of Present Illness:   Initial history:  Yulia Wolf is a 35y.o.-year-old male w a week cough and aches,  Pains sore throat, coughed blood today and came in - ill x 11 days - CT chest covid diffuse pneumonia   covid + 5 days PTA  Non vaccinated  Exam left tonsillar edema w uvula shift. No exudates seen - drained by ENt in ER lots of pus  Sent for cx        Interval changes  12/7/2021   Patient Vitals for the past 8 hrs:   BP Temp Temp src Pulse Resp   12/07/21 2011 (!) 141/86 98.2 °F (36.8 °C) Oral (!) 44 17   12/5,  Sore throat improved, left tonsillar edema remains  CRP 92  Oxygen requirement increased 4 L of nasal cannula  [id number]   Ferritin1,050 High      We will start baricitinib and follow on the tonsillitis and on CRP  Discussed with patient    12/6  Saturating 93%, feeling  better nasal cannula 3 L.   Unfortunately he desaturates when he moves  CRP improved 47    12/7  CRP 29,  Sputum culture is mixed but normal jigna  Patient is on 3 L of nasal cannula saturating 94%, feeling better in general      Summary of relevant labs:  Labs:  W 5.4  CRP92 -  47  [id number]   Ferritin1,050 High  Micro:  covid +  Imaging:  CT chest diffuse covid    I have personally reviewed the past medical history, past surgical history, medications, social history, and family history, and I haveupdated the database accordingly. Allergies:   Patient has no known allergies. Review of Systems:     Review of Systems   Constitutional: Positive for activity change. HENT: Positive for sore throat. Negative for congestion. Eyes: Negative for itching. Respiratory: Positive for cough and shortness of breath. Cardiovascular: Negative for chest pain. Gastrointestinal: Negative for abdominal distention. Endocrine: Negative for heat intolerance, polydipsia and polyphagia. Genitourinary: Negative for dysuria. Musculoskeletal: Negative for arthralgias. Skin: Negative for color change. Allergic/Immunologic: Negative for immunocompromised state. Neurological: Negative for dizziness, seizures, speech difficulty, light-headedness and headaches. Hematological: Negative for adenopathy. Psychiatric/Behavioral: Negative for agitation. Physical Examination :       Physical Exam  Constitutional:       Appearance: Normal appearance. He is normal weight. HENT:      Head: Normocephalic and atraumatic. Nose: Nose normal.      Mouth/Throat:      Mouth: Mucous membranes are moist.      Pharynx: Posterior oropharyngeal erythema present. Eyes:      General: No scleral icterus. Conjunctiva/sclera: Conjunctivae normal.   Cardiovascular:      Rate and Rhythm: Normal rate and regular rhythm. Heart sounds: Normal heart sounds. No murmur heard. Pulmonary:      Breath sounds: No stridor. No rhonchi.    Abdominal:      General: There is no distension. Palpations: Abdomen is soft. Tenderness: There is no abdominal tenderness. Genitourinary:     Comments: No vail  Musculoskeletal:      Cervical back: Neck supple. No rigidity. Right lower leg: No edema. Left lower leg: No edema. Skin:     Coloration: Skin is not jaundiced or pale. Findings: No bruising or erythema. Neurological:      General: No focal deficit present. Mental Status: He is alert and oriented to person, place, and time. Psychiatric:         Mood and Affect: Mood normal.         Thought Content: Thought content normal.         Past Medical History:   No past medical history on file.     Past Surgical  History:     Past Surgical History:   Procedure Laterality Date    EKG 12-LEAD  1/4/2016         SINUS SURGERY         Medications:      baricitinib  4 mg Oral Daily    sodium chloride flush  5-40 mL IntraVENous 2 times per day    enoxaparin  30 mg SubCUTAneous BID    Vitamin D  6,000 Units Oral Daily    Followed by   Abdiel Cosme ON 12/12/2021] Vitamin D  2,000 Units Oral Daily    ampicillin-sulbactam  3,000 mg IntraVENous Q6H    dexamethasone  10 mg Oral Daily       Social History:     Social History     Socioeconomic History    Marital status:      Spouse name: Not on file    Number of children: Not on file    Years of education: Not on file    Highest education level: Not on file   Occupational History    Not on file   Tobacco Use    Smoking status: Light Tobacco Smoker     Packs/day: 0.25     Start date: 11/18/2012    Smokeless tobacco: Never Used   Substance and Sexual Activity    Alcohol use: Yes     Comment: occasional    Drug use: No    Sexual activity: Yes     Partners: Female   Other Topics Concern    Not on file   Social History Narrative    Not on file     Social Determinants of Health     Financial Resource Strain: Low Risk     Difficulty of Paying Living Expenses: Not hard at all   Food Insecurity: No Food Insecurity Component Value Date    CREATININE 0.82 12/05/2021    GLUCOSE 116 12/05/2021       Detailed results: Thank you for allowing us to participate in the care of this patient. Please call with questions. This note is created with the assistance of a speech recognition program.  While intending to generate adocument that actually reflects the content of the visit, the document can still have some errors including those of syntax and sound a like substitutions which may escape proof reading. It such instances, actual meaningcan be extrapolated by contextual diversion.     Angélica Albert MD  Office: [phone]  Perfect serve / office [phone]

## 2021-12-08 NOTE — PROGRESS NOTES
12/08/21 1129   Resting (Room Air)   SpO2 91   HR 47   Resting (On O2)   SpO2 92   HR 50   O2 Device Nasal cannula   O2 Flow Rate (l/min) 2.5 l/min   During Walk (Room Air)   SpO2 90   HR 54   Rate of Dyspnea 0   After Walk   Does the Patient Qualify for Home O2 No

## 2021-12-08 NOTE — PROGRESS NOTES
Pt given new medication education , follow up instructions, and discharge instructions. All questions answered.

## 2021-12-08 NOTE — DISCHARGE INSTR - DIET
Good nutrition is important when healing from an illness, injury, or surgery. Follow any nutrition recommendations given to you during your hospital stay. If you were given an oral nutrition supplement while in the hospital, continue to take this supplement at home. You can take it with meals, in-between meals, and/or before bedtime. These supplements can be purchased at most local grocery stores, pharmacies, and chain [facility]-stores. If you have any questions about your diet or nutrition, call the hospital and ask for the dietitian.

## 2021-12-08 NOTE — PLAN OF CARE
falls  Outcome: Completed  Goal: Absence of physical injury  Description: Absence of physical injury  Outcome: Completed

## 2021-12-08 NOTE — DISCHARGE INSTR - COC
Continuity of Care Form    Patient Name: Allan Yanes   :  1980  MRN:  [id number]    Admit date:  2021  Discharge date:  ***    Code Status Order: Full Code   Advance Directives:      Admitting Physician:  Martin Vera MD  PCP: Lonnie Meza    Discharging Nurse: [facility]/Room#: 8772/1168-72  Discharging Unit Phone Number: ***    Emergency Contact:   Extended Emergency Contact Information  Primary Emergency Contact: Sea Denton   [facility] [address] Phone: [phone]  Relation: Parent  Secondary Emergency Contact: Jamil Velazquez  Address: [address], [address] [facility] [address] Phone: [phone]  Relation: Spouse    Past Surgical History:  Past Surgical History:   Procedure Laterality Date    EKG 12-LEAD  2016         SINUS SURGERY         Immunization History: There is no immunization history on file for this patient.     Active Problems:  Patient Active Problem List   Diagnosis Code    Tobacco abuse Z72.0    Tachycardia R00.0    Strep pharyngitis J02.0    Pneumonia due to COVID-19 virus U07.1, J12.82       Isolation/Infection:   Isolation            Droplet Plus          Patient Infection Status       Infection Onset Added Last Indicated Last Indicated By Review Planned Expiration Resolved Resolved By    COVID-19 21 COVID-19 21      Resolved    COVID-19 (Rule Out) 21 COVID-19 (Ordered)   21 Rule-Out Test Resulted    COVID-19 (Rule Out) 20 Covid-19 Ambulatory (Ordered)   20 Rule-Out Test Resulted            Nurse Assessment:  Last Vital Signs: /87   Pulse 69   Temp 98.1 °F (36.7 °C) (Oral)   Resp 21   Ht 6' (1.829 m)   Wt 201 lb 1.6 oz (91.2 kg)   SpO2 94%   BMI 27.27 kg/m²     Last documented pain score (0-10 scale): Pain Level: 0  Last Weight:   Wt Readings from Last 1 Encounters:   21 201 lb 1.6 oz (91.2 kg)     Mental Status:  {IP PT MENTAL STATUS:42693}    IV Access:  { SALLY IV ACCESS:291448130}    Nursing Mobility/ADLs:  Walking   {CHP DME SKHO:893091735}  Transfer  {CHP DME SROE:344602214}  Bathing  {CHP DME QCIL:583664145}  Dressing  {CHP DME MHED:430035054}  Toileting  {CHP DME TIYD:134281496}  Feeding  {CHP DME XQFA:116849274}  Med Admin  {CHP DME FXLT:416036000}  Med Delivery   { SALLY MED Delivery:555414438}    Wound Care Documentation and Therapy:        Elimination:  Continence: Bowel: {YES / LP:30066}  Bladder: {YES / MS:74323}  Urinary Catheter: {Urinary Catheter:265585438}   Colostomy/Ileostomy/Ileal Conduit: {YES / CT:94440}       Date of Last BM: ***  No intake or output data in the 24 hours ending 21 1548  No intake/output data recorded.     Safety Concerns:     [employer] Safety Concerns:638976187}    Impairments/Disabilities:      [employer] Impairments/Disabilities:430827490}    Nutrition Therapy:  Current Nutrition Therapy:   [employer] Diet List:145819436}    Routes of Feeding: {CHP DME Other Feedings:456761963}  Liquids: {Slp liquid thickness:27654}  Daily Fluid Restriction: {CHP DME Yes amt example:831963358}  Last Modified Barium Swallow with Video (Video Swallowing Test): {Done Not Done ZAIC:089734664}    Treatments at the Time of Hospital Discharge:   Respiratory Treatments: ***  Oxygen Therapy:  {Therapy; copd oxygen:07129}  Ventilator:    {[facility] Vent RWSS:828482673}    Rehab Therapies: {THERAPEUTIC INTERVENTION:7924177496}  Weight Bearing Status/Restrictions: [employer] Weight Bearin}  Other Medical Equipment (for information only, NOT a DME order):  {EQUIPMENT:868547190}  Other Treatments: ***    Patient's personal belongings (please select all that are sent with patient):  {[facility] DME Belongings:053070131}    RN SIGNATURE:  {Esignature:570824696}    CASE MANAGEMENT/SOCIAL WORK SECTION    Inpatient Status Date: ***    Readmission Risk Assessment Score:  Readmission Risk              Risk of Unplanned Readmission:  7           Discharging to Facility/ Agency   Name:   Address:  Phone:  Fax:    Dialysis Facility (if applicable)   Name:  Address:  Dialysis Schedule:  Phone:  Fax:    / signature: {Esignature:874642276}    PHYSICIAN SECTION    Prognosis: {Prognosis:1138554491}    Condition at Discharge: Jamari Lee Patient Condition:185526268}    Rehab Potential (if transferring to Rehab): {Prognosis:7931686634}    Recommended Labs or Other Treatments After Discharge: ***    Physician Certification: I certify the above information and transfer of Lion Roberts  is necessary for the continuing treatment of the diagnosis listed and that he requires {Admit to Appropriate Level of Care:68785} for {GREATER/LESS:626024880} 30 days.      Update Admission H&P: {CHP DME Changes in TVNB:989911345}    PHYSICIAN SIGNATURE:  {Esignature:027423694}

## 2021-12-08 NOTE — PLAN OF CARE
Problem: Airway Clearance - Ineffective  Goal: Achieve or maintain patent airway  Outcome: Completed     Problem: Gas Exchange - Impaired  Goal: Absence of hypoxia  Outcome: Completed  Goal: Promote optimal lung function  Outcome: Completed     Problem: Breathing Pattern - Ineffective  Goal: Ability to achieve and maintain a regular respiratory rate  Outcome: Completed     Problem:  Body Temperature -  Risk of, Imbalanced  Goal: Ability to maintain a body temperature within defined limits  Outcome: Completed  Goal: Will regain or maintain usual level of consciousness  Outcome: Completed  Goal: Complications related to the disease process, condition or treatment will be avoided or minimized  Outcome: Completed     Problem: Isolation Precautions - Risk of Spread of Infection  Goal: Prevent transmission of infection  Outcome: Completed     Problem: Nutrition Deficits  Goal: Optimize nutritional status  Outcome: Completed     Problem: Risk for Fluid Volume Deficit  Goal: Maintain normal heart rhythm  Outcome: Completed  Goal: Maintain absence of muscle cramping  Outcome: Completed  Goal: Maintain normal serum potassium, sodium, calcium, phosphorus, and pH  Outcome: Completed     Problem: Loneliness or Risk for Loneliness  Goal: Demonstrate positive use of time alone when socialization is not possible  Outcome: Completed     Problem: Fatigue  Goal: Verbalize increase energy and improved vitality  Outcome: Completed     Problem: Patient Education: Go to Patient Education Activity  Goal: Patient/Family Education  Outcome: Completed     Problem: Pain:  Goal: Pain level will decrease  Description: Pain level will decrease  Outcome: Completed  Goal: Control of acute pain  Description: Control of acute pain  Outcome: Completed  Goal: Control of chronic pain  Description: Control of chronic pain  Outcome: Completed     Problem: Falls - Risk of:  Goal: Will remain free from falls  Description: Will remain free from falls  Outcome: Completed  Goal: Absence of physical injury  Description: Absence of physical injury  Outcome: Completed

## 2021-12-08 NOTE — DISCHARGE SUMMARY
[facility]  Office: [address], DO, Tavaresrpiya Azul, DO, Tommy Arriola, DO, Anabel Olson Blood, DO, Arthur Ibarra MD, Sherly Mckeon MD, Jimmy Hare MD, Kanika New MD, Rodri Dawkins MD, Kodi Naylor MD, Pato Baker MD, Ursula Shankar, DO, Angie Bryson, DO, Nidhi Pryor MD,  Meme Manzo DO, Cristian Becerra MD, Heladio Mccloud MD, Brayan Barrow MD, Toya Rojas MD, Gio Major MD, Bob Garcia MD, Jenny Romero MD, Fransico Swan, [facility], [facility], CNP, Michelle Naqvi, CNP, Praneeth Duarte, CNS, Ailene Lesches, CNP, Ivelisse Langley, CNP, Nelida Hines, CNP, Ronda Charles, CNP, Macarena Franz, CNP, Kirk Tucker PA-C, Nadja Ortiz, [facility], Efra Pyle, CNP, Missy Tijerina, CNP, Norbert Bain, CNP, Mira Barboza, CNP, Emmy Mccullough, CNP, Sue Sweeney, CNP, Sunita Gill, [facility]    Discharge Summary     Patient ID: Leanna Barcenas  :  1980   MRN: [id number]     ACCOUNT:  [de-identified]   Patient's PCP: Meg Cat  Admit Date: 2021   Discharge Date: 2021     Length of Stay: 4  Code Status:  Full Code  Admitting Physician: Heladio Mccloud MD  Discharge Physician: Heladio Mccloud MD     Active Discharge Diagnoses:     Hospital Problem Lists:  Principal Problem:    Pneumonia due to COVID-19 virus  Resolved Problems:    Tonsillar abscess      Admission Condition:  poor     Discharged Condition: good    Hospital Stay:     Hospital Course:      68-year-old male who came from [place] with complaints of sore throat and hemoptysis. Patient was noted to have peritonsillar swelling with uvula deviation. CT scan was ordered which showed left peritonsillar abscess measuring 3.6 x 1.9 x 1.9 cm. Patient was also noted to have small bilateral upper lobe infiltrates and groundglass opacities. Subsequently patient tested positive for COVID-19 pneumonia.   Patient was transferred to [facility] for further treatment. Patient underwent evaluation from ENT and had abscess drained. He was also started on IV Unasyn. Patient was also getting treatment with Decadron and baricitinib for Covid infection. This morning patient has been transitioned to oral antibiotics. He will be discharged home on oral Augmentin and dexamethasone. Patient is to follow-up with PCP within 1 week after discharge.       Significant therapeutic interventions: IV antibiotics and Decadron    Significant Diagnostic Studies:   Labs / Micro:  CBC:   Lab Results   Component Value Date    WBC 7.9 12/05/2021    RBC 5.00 12/05/2021    HGB 15.4 12/05/2021    HCT 45.6 12/05/2021    MCV 91.2 12/05/2021    MCH 30.8 12/05/2021    MCHC 33.8 12/05/2021    RDW 11.9 12/05/2021     12/05/2021     BMP:    Lab Results   Component Value Date    GLUCOSE 116 12/05/2021     12/05/2021    K 4.8 12/05/2021     12/05/2021    CO2 28 12/05/2021    ANIONGAP 10 12/05/2021    BUN 16 12/05/2021    CREATININE 0.82 12/05/2021    BUNCRER NOT REPORTED 12/05/2021    CALCIUM 9.2 12/05/2021    LABGLOM >60 12/05/2021    LABGLOM >90 01/04/2016    GFRAA >60 12/05/2021    GFR      12/05/2021    GFR NOT REPORTED 12/05/2021    GFR 72 01/03/2016     HFP:    Lab Results   Component Value Date    PROT 6.6 12/05/2021     CMP:    Lab Results   Component Value Date    GLUCOSE 116 12/05/2021     12/05/2021    K 4.8 12/05/2021     12/05/2021    CO2 28 12/05/2021    BUN 16 12/05/2021    CREATININE 0.82 12/05/2021    ANIONGAP 10 12/05/2021    ALKPHOS 67 12/05/2021    ALT 15 12/05/2021    AST 23 12/05/2021    BILITOT 0.48 12/05/2021    LABALBU 3.7 12/05/2021    ALBUMIN 1.3 12/05/2021    LABGLOM >60 12/05/2021    LABGLOM >90 01/04/2016    GFRAA >60 12/05/2021    GFR      12/05/2021    GFR NOT REPORTED 12/05/2021    GFR 72 01/03/2016    PROT 6.6 12/05/2021    CALCIUM 9.2 12/05/2021     PT/INR:  No results found for: PTINR, PROTIME, INR  PTT: No results found for: APTT  FLP:  No results found for: CHOL, TRIG, HDL  U/A:    Lab Results   Component Value Date    COLORU yellow 11/29/2021    SPECGRAV >1.030 11/29/2021    PHUR 5.5 11/29/2021    PROTEINU 3+ 11/29/2021    GLUCOSEU negative 11/29/2021    [address] trace 11/29/2021    BILIRUBINUR negative 11/29/2021    LEUKOCYTESUR negative 11/29/2021     TSH:  No results found for: TSH     Radiology:  CT SOFT TISSUE NECK W CONTRAST    Result Date: 12/4/2021  Left peritonsillar abscess measuring 3.6 x 1.9 x 1.9 cm. Small bilateral upper lobe infiltrates may represent multifocal pneumonia. COVID-19 pneumonia is a possibility. CT CHEST PULMONARY EMBOLISM W CONTRAST    Result Date: 12/4/2021  Ground-glass opacity is seen throughout the lungs bilaterally consistent with atypical pneumonia. No acute or chronic pulmonary embolism. Consultations:    Consults:     Final Specialist Recommendations/Findings:   IP CONSULT TO OTOLARYNGOLOGY  IP CONSULT TO HOSPITALIST  IP CONSULT TO INFECTIOUS DISEASES  IP CONSULT TO OTOLARYNGOLOGY  IP CONSULT TO PHARMACY      The patient was seen and examined on day of discharge and this discharge summary is in conjunction with any daily progress note from day of discharge. Discharge plan:     Disposition: Home    Physician Follow Up:     No follow-up provider specified. Requiring Further Evaluation/Follow Up POST HOSPITALIZATION/Incidental Findings: Continue current management.   Patient does not require any oxygen at this time based on home O2 evaluation results    Diet: cardiac diet    Activity: As tolerated    Instructions to Patient: Be compliant with your diet, medication, follow-ups    Discharge Medications:      Medication List      START taking these medications    amoxicillin-clavulanate 875-125 MG per tablet  Commonly known as: AUGMENTIN  Take 1 tablet by mouth 2 times daily for 10 days     dexamethasone 2 MG tablet  Commonly known as: DECADRON  Take 5 tablets by mouth daily for 6 doses  Start taking on: December 9, 2021        CONTINUE taking these medications    ibuprofen 800 MG tablet  Commonly known as: ADVIL;MOTRIN  Take 1 tablet by mouth every 8 hours as needed for Pain (with food)           Where to Get Your Medications      These medications were sent to [facility] [address] [facility], [address] [facility]  [address], [address]  [address]    Phone: [phone]   · amoxicillin-clavulanate 875-125 MG per tablet  · dexamethasone 2 MG tablet         No discharge procedures on file. Time Spent on discharge is  40 mins in patient examination, evaluation, counseling as well as medication reconciliation, prescriptions for required medications, discharge plan and follow up. Electronically signed by   Christian Pang MD  12/8/2021  2:18 PM      Thank you Dr. Shannan Jarrell for the opportunity to be involved in this patient's care.

## 2021-12-08 NOTE — PROGRESS NOTES
CLINICAL PHARMACY NOTE: MEDS TO BEDS    Total # of Prescriptions Filled: 2   The following medications were delivered to the patient:  · AUGMENTIN 875   · DEXAMETHASONE 2     Additional Documentation:  PAID WITH CARD

## 2021-12-08 NOTE — PLAN OF CARE
Problem: Airway Clearance - Ineffective  Goal: Achieve or maintain patent airway  12/7/2021 1950 by Carroll Hunt RN  Outcome: Ongoing  12/7/2021 1801 by Ashley Haddad RN  Outcome: Ongoing     Problem: Gas Exchange - Impaired  Goal: Absence of hypoxia  12/7/2021 1950 by Carroll Hunt RN  Outcome: Ongoing  12/7/2021 1801 by Ashley Haddad RN  Outcome: Ongoing  Goal: Promote optimal lung function  12/7/2021 1950 by Carroll Hunt RN  Outcome: Ongoing  12/7/2021 1801 by Ahsley Haddad RN  Outcome: Ongoing     Problem: Breathing Pattern - Ineffective  Goal: Ability to achieve and maintain a regular respiratory rate  12/7/2021 1950 by Carroll Hunt RN  Outcome: Ongoing  12/7/2021 1801 by Ashley Haddad RN  Outcome: Ongoing     Problem:  Body Temperature -  Risk of, Imbalanced  Goal: Ability to maintain a body temperature within defined limits  12/7/2021 1950 by Carroll Hunt RN  Outcome: Ongoing  12/7/2021 1801 by Ashley Haddad RN  Outcome: Ongoing  Goal: Will regain or maintain usual level of consciousness  12/7/2021 1950 by Carroll Hunt RN  Outcome: Ongoing  12/7/2021 1801 by Ashley Haddad RN  Outcome: Ongoing  Goal: Complications related to the disease process, condition or treatment will be avoided or minimized  12/7/2021 1950 by Carroll Hunt RN  Outcome: Ongoing  12/7/2021 1801 by Ashley Haddad RN  Outcome: Ongoing     Problem: Isolation Precautions - Risk of Spread of Infection  Goal: Prevent transmission of infection  12/7/2021 1950 by Carroll Hunt RN  Outcome: Ongoing  12/7/2021 1801 by Ashley Haddad RN  Outcome: Ongoing     Problem: Nutrition Deficits  Goal: Optimize nutritional status  12/7/2021 1950 by Carroll Hunt RN  Outcome: Ongoing  12/7/2021 1801 by Ashley Haddad RN  Outcome: Ongoing     Problem: Risk for Fluid Volume Deficit  Goal: Maintain normal heart rhythm  12/7/2021 1950 by Carroll Hunt RN  Outcome: Ongoing  12/7/2021 1801 by Anat Courtney ELIZABETH Cadena  Outcome: Ongoing  Goal: Maintain absence of muscle cramping  12/7/2021 1950 by Sakina Kent RN  Outcome: Ongoing  12/7/2021 1801 by Dc Mueller RN  Outcome: Ongoing  Goal: Maintain normal serum potassium, sodium, calcium, phosphorus, and pH  12/7/2021 1950 by Sakina Kent RN  Outcome: Ongoing  12/7/2021 1801 by Dc Mueller RN  Outcome: Ongoing     Problem: Loneliness or Risk for Loneliness  Goal: Demonstrate positive use of time alone when socialization is not possible  12/7/2021 1950 by Sakina Kent RN  Outcome: Ongoing  12/7/2021 1801 by Dc Mueller RN  Outcome: Ongoing     Problem: Fatigue  Goal: Verbalize increase energy and improved vitality  12/7/2021 1950 by Sakina Kent RN  Outcome: Ongoing  12/7/2021 1801 by Dc Mueller RN  Outcome: Ongoing     Problem: Patient Education: Go to Patient Education Activity  Goal: Patient/Family Education  12/7/2021 1950 by Sakina Kent RN  Outcome: Ongoing  12/7/2021 1801 by Dc Mueller RN  Outcome: Ongoing     Problem: Pain:  Goal: Pain level will decrease  Description: Pain level will decrease  12/7/2021 1950 by Sakina Kent RN  Outcome: Ongoing  12/7/2021 1801 by Dc Mueller RN  Outcome: Ongoing  Goal: Control of acute pain  Description: Control of acute pain  12/7/2021 1950 by Sakina Kent RN  Outcome: Ongoing  12/7/2021 1801 by Dc Mueller RN  Outcome: Ongoing  Goal: Control of chronic pain  Description: Control of chronic pain  12/7/2021 1950 by Sakina Kent RN  Outcome: Ongoing  12/7/2021 1801 by Dc Mueller RN  Outcome: Ongoing     Problem: Falls - Risk of:  Goal: Will remain free from falls  Description: Will remain free from falls  12/7/2021 1950 by Sakina Kent RN  Outcome: Ongoing  12/7/2021 1801 by Dc Mueller RN  Outcome: Ongoing  Goal: Absence of physical injury  Description: Absence of physical injury  12/7/2021 1950 by Sakina Kent RN  Outcome: Ongoing  12/7/2021 1801 by Kurtis Coleman, RN  Outcome: Ongoing

## 2021-12-09 ENCOUNTER — CARE COORDINATION (OUTPATIENT)
Dept: CASE MANAGEMENT | Age: 41
End: 2021-12-09

## 2021-12-09 NOTE — CARE COORDINATION
[facility] Transitions Initial Follow Up Call- 1st attempt     Call within 2 business days of discharge: Yes    Patient: Hue Espinoza Patient : 1980   MRN: [id number]  Reason for Admission: COVID-19 pneumonia, peritonsillar abscess, hypoxia   Discharge Date: 21 RARS: Readmission Risk Score: 4.1 ( )      Last Discharge [facility]       Complaint Diagnosis Description Type Department Provider    21 Abscess COVID-19 . .. ED to Hosp-Admission (Discharged) (ADMITTED) [facility] CAR 3 Michael Hunter MD; Rocío Rosa MD...    21 Positive For Covid-19; Hemoptysis; Pharyngitis Peritonsillar abscess . .. ED (TRANSFER) [facility] ED Shelly Menendez MD           Date/Time:  2021 11:28 AM  Attempted to reach patient by telephone. Call within 2 business days of discharge: Yes. Message when calling- \"please enter your remote access code\"- unable to leave message. Will attempt to reach patient again. Non-face-to-face services provided:  Assessment and support for treatment adherence and medication management-meds via meds to beds       Follow Up  No future appointments.     Abhi Dennis RN

## 2021-12-10 ENCOUNTER — CARE COORDINATION (OUTPATIENT)
Dept: CASE MANAGEMENT | Age: 41
End: 2021-12-10

## 2021-12-10 LAB
CULTURE: NORMAL
CULTURE: NORMAL
Lab: NORMAL
Lab: NORMAL
SPECIMEN DESCRIPTION: NORMAL
SPECIMEN DESCRIPTION: NORMAL

## 2021-12-10 NOTE — CARE COORDINATION
[facility] Transitions Initial Follow Up Call- COVID risk follow up call      Call within 2 business days of discharge: Yes    Patient: Yulia Wolf Patient : 1980   MRN: [id number]  Reason for Admission: COVID-19 pneumonia, peritonsillar abscess, hypoxia    Discharge Date: 21 RARS: Readmission Risk Score: 4.1 ( )      Last Discharge [facility]       Complaint Diagnosis Description Type Department Provider    21 Abscess COVID-19 . .. ED to Hosp-Admission (Discharged) (ADMITTED) [email or web address] CAR 3 Funmilayo Salazar MD; Estefany Herring MD...    21 Positive For Covid-19; Hemoptysis; Pharyngitis Peritonsillar abscess . .. ED (TRANSFER) [facility] Adamaris Bahena MD           Spoke with: Camryn Puckett     Call to pt who states he is tired but good. Denies SOB, throat pain or difficulty swallowing   States he does not have a pulse oximeter at home- writer educated on use and how to monitor oxygen levels   Denies fever/ chills, nausea  Confirms isolation through   He has not scheduled appointments yet but will do so on Monday   Denies needs  Encouraged to call writer/ CTC or providers if questions or concerns- v/u     Transitions of Care Initial Call    Was this an external facility discharge? No Discharge Facility: [facility] [address] to be reviewed by the provider   Additional needs identified to be addressed with provider: No  none             Method of communication with provider : none      Advance Care Planning:   Does patient have an Advance Directive: decision maker updated. Was this a readmission? No  Patient stated reason for admission: covid pneumonia, abscess in throat  Patients top risk factors for readmission: medical condition-covid pneumonia and abscess    Care Transition Nurse (CTN) contacted the patient by telephone to perform post hospital discharge assessment. Verified name and  with patient as identifiers. Provided introduction to self, and explanation of the CTN role. CTN reviewed discharge instructions, medical action plan and red flags with patient who verbalized understanding. Patient given an opportunity to ask questions and does not have any further questions or concerns at this time. Were discharge instructions available to patient? Yes. Reviewed appropriate site of care based on symptoms and resources available to patient including: PCP, Specialist, When to call 911 and ctn. The patient agrees to contact the PCP office for questions related to their healthcare. Medication reconciliation was performed with patient, who verbalizes understanding of administration of home medications. Advised obtaining a 90-day supply of all daily and as-needed medications. Covid Risk Education     Educated patient about risk for severe COVID-19 due to risk factors according to CDC guidelines. CTN reviewed discharge instructions, medical action plan and red flag symptoms with the patient who verbalized understanding. Discussed COVID vaccination status: Yes and has not been vaccinated. Education provided on COVID-19 vaccination as appropriate. Discussed exposure protocols and quarantine with CDC Guidelines. Patient was given an opportunity to verbalize any questions and concerns and agrees to contact CTN or health care provider for questions related to their healthcare. Reviewed and educated patient on any new and changed medications related to discharge diagnosis. Was patient discharged with a pulse oximeter? No but educated on use for monitoring oxygen levels. Discussed and confirmed pulse oximeter discharge instructions and when to notify provider or seek emergency care. CTN provided contact information. Plan for follow-up call in 5-7 days based on severity of symptoms and risk factors.   Plan for next call: symptom management-routine follow up   follow up appointment-PCP and ENT appts           Non-face-to-face services provided:  Scheduled appointment with PCP-pt calling on Monday to schedule. [place] assistance with this   Scheduled appointment with Specialist-pt calling on Monday to schedule with ENT. [place] assistance with this  Obtained and reviewed discharge summary and/or continuity of care documents  Assessment and support for treatment adherence and medication management-confirms new meds     Care Transitions 24 Hour Call    Do you have any ongoing symptoms?: No  Do you have a copy of your discharge instructions?: Yes  Do you have all of your prescriptions and are they filled?: Yes  Have you been contacted by a [address]?: No  Have you scheduled your follow up appointment?: No  Were you discharged with any Home Care or Post Acute Services: No  Do you feel like you have everything you need to keep you well at home?: Yes  Care Transitions Interventions         Follow Up  No future appointments.     Lyric Clayton RN

## 2021-12-10 NOTE — CARE COORDINATION
[facility] Transitions Initial Follow Up Call- 2nd attempt COVID risk follow up call       Call within 2 business days of discharge: Yes    Patient: Dane Haynes Patient : 1980   MRN: [id number]  Reason for Admission: COVID-19 pneumonia, peritonsillar abscess, hypoxia    Discharge Date: 21 RARS: Readmission Risk Score: 4.1 ( )      Last Discharge [facility]       Complaint Diagnosis Description Type Department Provider    21 Abscess COVID-19 . .. ED to Hosp-Admission (Discharged) (ADMITTED) [facility] 3 Violet Jovel MD; Pako Mckay MD...    21 Positive For Covid-19; Hemoptysis; Pharyngitis Peritonsillar abscess . .. ED (TRANSFER) [address] Nicanor Paul MD           Spoke with: pt wife Beth Side (no HIPAA on file)     Date/Time:  12/10/2021 12:27 PM  Attempted to reach patient by telephone. Call within 2 business days of discharge: Yes Left HIPPA compliant message with pt wife requesting a return call. Will attempt to reach patient again. Follow Up  No future appointments.     Jemma Ortega, RN

## 2021-12-15 ENCOUNTER — CARE COORDINATION (OUTPATIENT)
Dept: CASE MANAGEMENT | Age: 41
End: 2021-12-15

## 2021-12-15 NOTE — CARE COORDINATION
[facility] Trans[facility] Follow Up Call- 1st attempt COVID risk follow up call       12/15/2021    Patient: Nia Mcgrath  Patient : 1980   MRN: [id number]  Reason for Admission: COVID-19 pneumonia, peritonsillar abscess, hypoxia     Discharge Date: 21 RARS: Readmission Risk Score: 4.1 ( )         Attempted to reach patient for subsequent transitional call. Message when calling- \"Please enter your remote access code\"- unable to leave message. Will attempt follow up at a later time. Follow Up  No future appointments.     Tom Oakley RN

## 2021-12-22 ENCOUNTER — CARE COORDINATION (OUTPATIENT)
Dept: CASE MANAGEMENT | Age: 41
End: 2021-12-22

## 2021-12-22 NOTE — CARE COORDINATION
[facility] Transitions Follow Up Call- 2nd attempt COVID risk follow up call       2021    Patient: Dalia Tena  Patient : 1980   MRN: [id number]  Reason for Admission:   Discharge Date: 21 RARS: Readmission Risk Score: 4.1 ( )         Attempted to reach patient for subsequent transitional call. 2nd attempt. Message when calling- message when calling- \"please enter your remote access code\"- unable to leave message. Episode closed. Follow Up  No future appointments.     Darshan Yeboah RN